# Patient Record
Sex: FEMALE | Race: WHITE | NOT HISPANIC OR LATINO | URBAN - METROPOLITAN AREA
[De-identification: names, ages, dates, MRNs, and addresses within clinical notes are randomized per-mention and may not be internally consistent; named-entity substitution may affect disease eponyms.]

---

## 2018-09-27 ENCOUNTER — OUTPATIENT (OUTPATIENT)
Dept: OUTPATIENT SERVICES | Facility: HOSPITAL | Age: 83
LOS: 1 days | End: 2018-09-27

## 2018-09-27 ENCOUNTER — APPOINTMENT (OUTPATIENT)
Dept: INFECTIOUS DISEASE | Facility: CLINIC | Age: 83
End: 2018-09-27
Payer: MEDICARE

## 2018-09-27 VITALS
RESPIRATION RATE: 12 BRPM | HEART RATE: 70 BPM | SYSTOLIC BLOOD PRESSURE: 138 MMHG | TEMPERATURE: 97.9 F | DIASTOLIC BLOOD PRESSURE: 66 MMHG | OXYGEN SATURATION: 98 %

## 2018-09-27 VITALS — HEIGHT: 62 IN | BODY MASS INDEX: 20.24 KG/M2 | WEIGHT: 110 LBS

## 2018-09-27 DIAGNOSIS — R79.89 OTHER SPECIFIED ABNORMAL FINDINGS OF BLOOD CHEMISTRY: ICD-10-CM

## 2018-09-27 LAB
ALBUMIN SERPL ELPH-MCNC: 4.3 G/DL — SIGNIFICANT CHANGE UP (ref 3.3–5)
ALP SERPL-CCNC: 68 U/L — SIGNIFICANT CHANGE UP (ref 40–120)
ALT FLD-CCNC: 24 U/L — SIGNIFICANT CHANGE UP (ref 10–45)
ANION GAP SERPL CALC-SCNC: 13 MMOL/L — SIGNIFICANT CHANGE UP (ref 5–17)
APPEARANCE UR: CLEAR — SIGNIFICANT CHANGE UP
AST SERPL-CCNC: 30 U/L — SIGNIFICANT CHANGE UP (ref 10–40)
BASOPHILS NFR BLD AUTO: 0.5 % — SIGNIFICANT CHANGE UP (ref 0–2)
BILIRUB SERPL-MCNC: 0.5 MG/DL — SIGNIFICANT CHANGE UP (ref 0.2–1.2)
BILIRUB UR-MCNC: NEGATIVE — SIGNIFICANT CHANGE UP
BUN SERPL-MCNC: 11 MG/DL — SIGNIFICANT CHANGE UP (ref 7–23)
CALCIUM SERPL-MCNC: 9.8 MG/DL — SIGNIFICANT CHANGE UP (ref 8.4–10.5)
CHLORIDE SERPL-SCNC: 97 MMOL/L — SIGNIFICANT CHANGE UP (ref 96–108)
CHOLEST SERPL-MCNC: 258 MG/DL — HIGH (ref 10–199)
CO2 SERPL-SCNC: 25 MMOL/L — SIGNIFICANT CHANGE UP (ref 22–31)
COLOR SPEC: SIGNIFICANT CHANGE UP
CREAT SERPL-MCNC: 0.87 MG/DL — SIGNIFICANT CHANGE UP (ref 0.5–1.3)
DIFF PNL FLD: NEGATIVE — SIGNIFICANT CHANGE UP
EOSINOPHIL NFR BLD AUTO: 1.5 % — SIGNIFICANT CHANGE UP (ref 0–6)
GLUCOSE SERPL-MCNC: 79 MG/DL — SIGNIFICANT CHANGE UP (ref 70–99)
GLUCOSE UR QL: NEGATIVE — SIGNIFICANT CHANGE UP
HBV CORE AB SER-ACNC: SIGNIFICANT CHANGE UP
HBV SURFACE AB SER-ACNC: REACTIVE — SIGNIFICANT CHANGE UP
HBV SURFACE AG SER-ACNC: SIGNIFICANT CHANGE UP
HCT VFR BLD CALC: 44.7 % — SIGNIFICANT CHANGE UP (ref 34.5–45)
HCV AB S/CO SERPL IA: 0.03 S/CO — SIGNIFICANT CHANGE UP
HCV AB SERPL-IMP: SIGNIFICANT CHANGE UP
HDLC SERPL-MCNC: 98 MG/DL — SIGNIFICANT CHANGE UP
HGB BLD-MCNC: 15.2 G/DL — SIGNIFICANT CHANGE UP (ref 11.5–15.5)
KETONES UR-MCNC: NEGATIVE — SIGNIFICANT CHANGE UP
LEUKOCYTE ESTERASE UR-ACNC: ABNORMAL
LIPID PNL WITH DIRECT LDL SERPL: 142 MG/DL — HIGH
LYMPHOCYTES # BLD AUTO: 41.4 % — SIGNIFICANT CHANGE UP (ref 13–44)
MCHC RBC-ENTMCNC: 32.8 PG — SIGNIFICANT CHANGE UP (ref 27–34)
MCHC RBC-ENTMCNC: 34 G/DL — SIGNIFICANT CHANGE UP (ref 32–36)
MCV RBC AUTO: 96.3 FL — SIGNIFICANT CHANGE UP (ref 80–100)
MONOCYTES NFR BLD AUTO: 9 % — SIGNIFICANT CHANGE UP (ref 2–14)
NEUTROPHILS NFR BLD AUTO: 47.6 % — SIGNIFICANT CHANGE UP (ref 43–77)
NITRITE UR-MCNC: NEGATIVE — SIGNIFICANT CHANGE UP
PH UR: 6 — SIGNIFICANT CHANGE UP (ref 5–8)
PLATELET # BLD AUTO: 184 K/UL — SIGNIFICANT CHANGE UP (ref 150–400)
POTASSIUM SERPL-MCNC: 4.8 MMOL/L — SIGNIFICANT CHANGE UP (ref 3.5–5.3)
POTASSIUM SERPL-SCNC: 4.8 MMOL/L — SIGNIFICANT CHANGE UP (ref 3.5–5.3)
PROT SERPL-MCNC: 7 G/DL — SIGNIFICANT CHANGE UP (ref 6–8.3)
PROT UR-MCNC: NEGATIVE MG/DL — SIGNIFICANT CHANGE UP
RBC # BLD: 4.64 M/UL — SIGNIFICANT CHANGE UP (ref 3.8–5.2)
RBC # FLD: 12.7 % — SIGNIFICANT CHANGE UP (ref 10.3–16.9)
SODIUM SERPL-SCNC: 135 MMOL/L — SIGNIFICANT CHANGE UP (ref 135–145)
SP GR SPEC: <=1.005 — SIGNIFICANT CHANGE UP (ref 1–1.03)
TOTAL CHOLESTEROL/HDL RATIO MEASUREMENT: 2.6 RATIO — LOW (ref 3.3–7.1)
TRIGL SERPL-MCNC: 89 MG/DL — SIGNIFICANT CHANGE UP (ref 10–149)
UROBILINOGEN FLD QL: 0.2 E.U./DL — SIGNIFICANT CHANGE UP
WBC # BLD: 6 K/UL — SIGNIFICANT CHANGE UP (ref 3.8–10.5)
WBC # FLD AUTO: 6 K/UL — SIGNIFICANT CHANGE UP (ref 3.8–10.5)

## 2018-09-27 PROCEDURE — 99203 OFFICE O/P NEW LOW 30 MIN: CPT

## 2018-09-27 NOTE — PHYSICAL EXAM
[No Acute Distress] : no acute distress [Well Nourished] : well nourished [Well Developed] : well developed [Well-Appearing] : well-appearing [Normal Sclera/Conjunctiva] : normal sclera/conjunctiva [PERRL] : pupils equal round and reactive to light [EOMI] : extraocular movements intact [Normal Outer Ear/Nose] : the outer ears and nose were normal in appearance [Normal Oropharynx] : the oropharynx was normal [No Lymphadenopathy] : no lymphadenopathy [No Respiratory Distress] : no respiratory distress  [Clear to Auscultation] : lungs were clear to auscultation bilaterally [No Accessory Muscle Use] : no accessory muscle use [Normal Rate] : normal rate  [Regular Rhythm] : with a regular rhythm [Normal S1, S2] : normal S1 and S2 [No Murmur] : no murmur heard

## 2018-09-28 LAB
24R-OH-CALCIDIOL SERPL-MCNC: 45.8 NG/ML — SIGNIFICANT CHANGE UP (ref 30–80)
4/8 RATIO: 0.73 RATIO — LOW (ref 0.86–4.14)
ABS CD8: 910 /UL — HIGH (ref 90–775)
CD3 BLASTS SPEC-ACNC: 1567 /UL — SIGNIFICANT CHANGE UP (ref 396–2024)
CD3 BLASTS SPEC-ACNC: 72 % — SIGNIFICANT CHANGE UP (ref 58–84)
CD4 %: 30 % — SIGNIFICANT CHANGE UP (ref 30–56)
CD8 %: 42 % — SIGNIFICANT CHANGE UP (ref 11–43)
HIV-1 VIRAL LOAD RESULT: ABNORMAL
HIV1 RNA # SERPL NAA+PROBE: SIGNIFICANT CHANGE UP
HIV1 RNA SER-IMP: SIGNIFICANT CHANGE UP
HIV1 RNA SERPL NAA+PROBE-ACNC: ABNORMAL
HIV1 RNA SERPL NAA+PROBE-LOG#: ABNORMAL LG COP/ML
T PALLIDUM AB TITR SER: NEGATIVE — SIGNIFICANT CHANGE UP
T-CELL CD4 SUBSET PNL BLD: 660 /UL — SIGNIFICANT CHANGE UP (ref 325–1251)
VIT B12 SERPL-MCNC: 881 PG/ML — SIGNIFICANT CHANGE UP (ref 232–1245)

## 2018-09-28 NOTE — ASSESSMENT
[FreeTextEntry1] : RTC ~ 2 weeks\par \par Dr. Jenkins (HIV specialist) [address] [facility]? [facility] [address], [place], [place]... [phone]\par \par Dr. Jones (PCP):  [address] [facility] [place] [address].... [phone]

## 2018-09-28 NOTE — HISTORY OF PRESENT ILLNESS
[de-identified] : 83 F with HIV (VLUD / 802 July). HIV test was negative in 1988 and then she was diagnosed in 1989. Acquired from sexual activity with . Started on AZT in 1992. Later  in Aug 1992, she discontinued her AZT and was off ART for 5.5 years. She was started back on ART with an unknown regimen, and then changed to Truvada 3 times weekly/Isentress daily BID for ~ 7 years. Has been undetectable for years. No resistances as far as she knows.\par \par No other comorbidities, just takes xanax for sleep.\par H/o shingles with post-herpetic neuralgia for 14 months which resolved after experimental treatment with chemotherapy.\par Aso takes B12 and Vit D3 for hair follicles; does not feel.

## 2018-09-28 NOTE — HEALTH RISK ASSESSMENT
[No falls in past year] : Patient reported no falls in the past year [0] : 2) Feeling down, depressed, or hopeless: Not at all (0) [Patient reported mammogram was normal] : Patient reported mammogram was normal [Patient reported bone density results were normal] : Patient reported bone density results were normal [] : No [de-identified] : never [de-identified] : 0- 1-2 per night [de-identified] : no other substances [[id number]] : 0 [MammogramDate] : 09/18 [PapSmearComments] : ? [BoneDensityDate] : 09/16 [BoneDensityComments] : n [ColonoscopyComments] : < 5 years ago. Normal [de-identified] : stable housing, moved to [place] a few weeks ago from [place] [FreeTextEntry3] :  [de-identified] : sexually active with  [de-identified] : Domestic Violence Screen:\par -Do you ever feel unsafe at home? "No"\par -Are you in a relationship in which you have been physically hurt or felt threatened? "No"\par -Have you ever been or are you currently concerned about harming your partner or someone close to you? "No" [de-identified] : h/o cataracts. within year [de-identified] : follows

## 2018-09-28 NOTE — END OF VISIT
[] : Resident [FreeTextEntry3] : labs today\par Education on HIV tehrapy and new meds\par Continue current regimen \par RTC in 2 weeks to discuss other options\par \par Collaterals to be requested

## 2018-09-29 LAB
C TRACH RRNA SPEC QL NAA+PROBE: SIGNIFICANT CHANGE UP
GAMMA INTERFERON BACKGROUND BLD IA-ACNC: 0.09 IU/ML — SIGNIFICANT CHANGE UP
M TB IFN-G BLD-IMP: NEGATIVE — SIGNIFICANT CHANGE UP
M TB IFN-G CD4+ BCKGRND COR BLD-ACNC: -0.01 IU/ML — SIGNIFICANT CHANGE UP
M TB IFN-G CD4+CD8+ BCKGRND COR BLD-ACNC: -0.02 IU/ML — SIGNIFICANT CHANGE UP
N GONORRHOEA RRNA SPEC QL NAA+PROBE: SIGNIFICANT CHANGE UP
QUANT TB PLUS MITOGEN MINUS NIL: 8.77 IU/ML — SIGNIFICANT CHANGE UP
SPECIMEN SOURCE: SIGNIFICANT CHANGE UP

## 2018-10-03 DIAGNOSIS — B20 HUMAN IMMUNODEFICIENCY VIRUS [HIV] DISEASE: ICD-10-CM

## 2018-10-03 DIAGNOSIS — Z00.00 ENCOUNTER FOR GENERAL ADULT MEDICAL EXAMINATION WITHOUT ABNORMAL FINDINGS: ICD-10-CM

## 2018-10-03 DIAGNOSIS — R79.89 OTHER SPECIFIED ABNORMAL FINDINGS OF BLOOD CHEMISTRY: ICD-10-CM

## 2018-10-16 ENCOUNTER — APPOINTMENT (OUTPATIENT)
Dept: INFECTIOUS DISEASE | Facility: CLINIC | Age: 83
End: 2018-10-16
Payer: MEDICARE

## 2018-10-16 ENCOUNTER — OUTPATIENT (OUTPATIENT)
Dept: OUTPATIENT SERVICES | Facility: HOSPITAL | Age: 83
LOS: 1 days | End: 2018-10-16

## 2018-10-16 PROCEDURE — 99214 OFFICE O/P EST MOD 30 MIN: CPT

## 2018-10-17 NOTE — HISTORY OF PRESENT ILLNESS
[de-identified] : 83 year old female with HIV (VL < 30 / CD4 of 660 Sep 27th 2018 on Truvada M,W,F and Isentress BID daily with optimal adherence) recently established care presents to discuss ART options. No genotype on file and no history of treatment failure or resistances per pt. Feels well with no complaints.\par \par CrCl 40

## 2018-10-22 DIAGNOSIS — B20 HUMAN IMMUNODEFICIENCY VIRUS [HIV] DISEASE: ICD-10-CM

## 2018-10-22 DIAGNOSIS — Z00.00 ENCOUNTER FOR GENERAL ADULT MEDICAL EXAMINATION WITHOUT ABNORMAL FINDINGS: ICD-10-CM

## 2018-11-15 ENCOUNTER — APPOINTMENT (OUTPATIENT)
Dept: INFECTIOUS DISEASE | Facility: CLINIC | Age: 83
End: 2018-11-15
Payer: MEDICARE

## 2018-11-15 ENCOUNTER — OUTPATIENT (OUTPATIENT)
Dept: OUTPATIENT SERVICES | Facility: HOSPITAL | Age: 83
LOS: 1 days | End: 2018-11-15

## 2018-11-15 VITALS
RESPIRATION RATE: 12 BRPM | HEART RATE: 72 BPM | DIASTOLIC BLOOD PRESSURE: 70 MMHG | SYSTOLIC BLOOD PRESSURE: 120 MMHG | TEMPERATURE: 98 F

## 2018-11-15 LAB
ALBUMIN SERPL ELPH-MCNC: 4.5 G/DL — SIGNIFICANT CHANGE UP (ref 3.3–5)
ALP SERPL-CCNC: 57 U/L — SIGNIFICANT CHANGE UP (ref 40–120)
ALT FLD-CCNC: 14 U/L — SIGNIFICANT CHANGE UP (ref 10–45)
ANION GAP SERPL CALC-SCNC: 15 MMOL/L — SIGNIFICANT CHANGE UP (ref 5–17)
AST SERPL-CCNC: 19 U/L — SIGNIFICANT CHANGE UP (ref 10–40)
BASOPHILS NFR BLD AUTO: 0.4 % — SIGNIFICANT CHANGE UP (ref 0–2)
BILIRUB SERPL-MCNC: 0.5 MG/DL — SIGNIFICANT CHANGE UP (ref 0.2–1.2)
BUN SERPL-MCNC: 14 MG/DL — SIGNIFICANT CHANGE UP (ref 7–23)
CALCIUM SERPL-MCNC: 9.9 MG/DL — SIGNIFICANT CHANGE UP (ref 8.4–10.5)
CHLORIDE SERPL-SCNC: 97 MMOL/L — SIGNIFICANT CHANGE UP (ref 96–108)
CO2 SERPL-SCNC: 23 MMOL/L — SIGNIFICANT CHANGE UP (ref 22–31)
CREAT SERPL-MCNC: 0.86 MG/DL — SIGNIFICANT CHANGE UP (ref 0.5–1.3)
EOSINOPHIL NFR BLD AUTO: 0.9 % — SIGNIFICANT CHANGE UP (ref 0–6)
GLUCOSE SERPL-MCNC: 89 MG/DL — SIGNIFICANT CHANGE UP (ref 70–99)
HCT VFR BLD CALC: 42.6 % — SIGNIFICANT CHANGE UP (ref 34.5–45)
HGB BLD-MCNC: 14.2 G/DL — SIGNIFICANT CHANGE UP (ref 11.5–15.5)
LYMPHOCYTES # BLD AUTO: 38 % — SIGNIFICANT CHANGE UP (ref 13–44)
MCHC RBC-ENTMCNC: 32.3 PG — SIGNIFICANT CHANGE UP (ref 27–34)
MCHC RBC-ENTMCNC: 33.3 G/DL — SIGNIFICANT CHANGE UP (ref 32–36)
MCV RBC AUTO: 97 FL — SIGNIFICANT CHANGE UP (ref 80–100)
MONOCYTES NFR BLD AUTO: 9.1 % — SIGNIFICANT CHANGE UP (ref 2–14)
NEUTROPHILS NFR BLD AUTO: 51.6 % — SIGNIFICANT CHANGE UP (ref 43–77)
PLATELET # BLD AUTO: 215 K/UL — SIGNIFICANT CHANGE UP (ref 150–400)
POTASSIUM SERPL-MCNC: 4.8 MMOL/L — SIGNIFICANT CHANGE UP (ref 3.5–5.3)
POTASSIUM SERPL-SCNC: 4.8 MMOL/L — SIGNIFICANT CHANGE UP (ref 3.5–5.3)
PROT SERPL-MCNC: 7.4 G/DL — SIGNIFICANT CHANGE UP (ref 6–8.3)
RBC # BLD: 4.39 M/UL — SIGNIFICANT CHANGE UP (ref 3.8–5.2)
RBC # FLD: 12.7 % — SIGNIFICANT CHANGE UP (ref 10.3–16.9)
SODIUM SERPL-SCNC: 135 MMOL/L — SIGNIFICANT CHANGE UP (ref 135–145)
WBC # BLD: 6.7 K/UL — SIGNIFICANT CHANGE UP (ref 3.8–10.5)
WBC # FLD AUTO: 6.7 K/UL — SIGNIFICANT CHANGE UP (ref 3.8–10.5)

## 2018-11-15 PROCEDURE — 99214 OFFICE O/P EST MOD 30 MIN: CPT

## 2018-11-15 NOTE — PHYSICAL EXAM
[No Acute Distress] : no acute distress [Normal Sclera/Conjunctiva] : normal sclera/conjunctiva [PERRL] : pupils equal round and reactive to light [EOMI] : extraocular movements intact [Normal Outer Ear/Nose] : the outer ears and nose were normal in appearance [Normal Oropharynx] : the oropharynx was normal [No Lymphadenopathy] : no lymphadenopathy [No Respiratory Distress] : no respiratory distress  [Clear to Auscultation] : lungs were clear to auscultation bilaterally [No Accessory Muscle Use] : no accessory muscle use [Normal Rate] : normal rate  [Regular Rhythm] : with a regular rhythm [Normal S1, S2] : normal S1 and S2 [No Murmur] : no murmur heard

## 2018-11-16 LAB
4/8 RATIO: 0.61 RATIO — LOW (ref 0.86–4.14)
ABS CD8: 857 /UL — HIGH (ref 90–775)
CD3 BLASTS SPEC-ACNC: 1406 /UL — SIGNIFICANT CHANGE UP (ref 396–2024)
CD3 BLASTS SPEC-ACNC: 62 % — SIGNIFICANT CHANGE UP (ref 58–84)
CD4 %: 23 % — LOW (ref 30–56)
CD8 %: 38 % — SIGNIFICANT CHANGE UP (ref 11–43)
HIV-1 VIRAL LOAD RESULT: SIGNIFICANT CHANGE UP
HIV1 RNA # SERPL NAA+PROBE: SIGNIFICANT CHANGE UP
HIV1 RNA SER-IMP: SIGNIFICANT CHANGE UP
HIV1 RNA SERPL NAA+PROBE-ACNC: SIGNIFICANT CHANGE UP
HIV1 RNA SERPL NAA+PROBE-LOG#: SIGNIFICANT CHANGE UP LG COP/ML
T-CELL CD4 SUBSET PNL BLD: 525 /UL — SIGNIFICANT CHANGE UP (ref 325–1251)

## 2018-11-16 NOTE — HISTORY OF PRESENT ILLNESS
[Spouse] : spouse [de-identified] : 83 year old female with HIV (VL < 30 / CD4 of 660 Sep 27th 2018 changed from Truvada M,W,F and Isentress BID daily to Juluca 1 month ago; with optimal adherence) presents for follow-up of medication change. Since changing to Juluca, she had been having night sweats mostly every night, except last night, and possibly more fatigue. Otherwise no side effects. No missed doses. Feels well with no complaints.\par \par CrCl 40

## 2018-11-16 NOTE — END OF VISIT
[] : Resident [FreeTextEntry3] : Has night seats recently that she attributes to the new HIV meds\par \par Recommended changing the timing of meds\par \par Labs today\par Re-eval in 2 months\par If still with side effects with consider changing ARVS\par \par Spoke with  who is HIV negative and was accompanying his wife today

## 2018-11-16 NOTE — REVIEW OF SYSTEMS
[Fever] : no fever [Chills] : no chills [Recent Change In Weight] : ~T no recent weight change [Chest Pain] : no chest pain [Shortness Of Breath] : no shortness of breath [Abdominal Pain] : no abdominal pain [Nausea] : no nausea [Vomiting] : no vomiting

## 2018-11-20 DIAGNOSIS — B20 HUMAN IMMUNODEFICIENCY VIRUS [HIV] DISEASE: ICD-10-CM

## 2019-01-07 ENCOUNTER — RX RENEWAL (OUTPATIENT)
Age: 84
End: 2019-01-07

## 2019-01-15 ENCOUNTER — OUTPATIENT (OUTPATIENT)
Dept: OUTPATIENT SERVICES | Facility: HOSPITAL | Age: 84
LOS: 1 days | End: 2019-01-15

## 2019-01-15 ENCOUNTER — APPOINTMENT (OUTPATIENT)
Dept: INFECTIOUS DISEASE | Facility: CLINIC | Age: 84
End: 2019-01-15
Payer: MEDICARE

## 2019-01-15 VITALS
WEIGHT: 111 LBS | OXYGEN SATURATION: 95 % | DIASTOLIC BLOOD PRESSURE: 60 MMHG | SYSTOLIC BLOOD PRESSURE: 133 MMHG | HEIGHT: 62 IN | RESPIRATION RATE: 12 BRPM | BODY MASS INDEX: 20.43 KG/M2 | TEMPERATURE: 97.7 F | HEART RATE: 66 BPM

## 2019-01-15 LAB
ALBUMIN SERPL ELPH-MCNC: 4.1 G/DL — SIGNIFICANT CHANGE UP (ref 3.3–5)
ALP SERPL-CCNC: 59 U/L — SIGNIFICANT CHANGE UP (ref 40–120)
ALT FLD-CCNC: 13 U/L — SIGNIFICANT CHANGE UP (ref 10–45)
ANION GAP SERPL CALC-SCNC: 14 MMOL/L — SIGNIFICANT CHANGE UP (ref 5–17)
AST SERPL-CCNC: 18 U/L — SIGNIFICANT CHANGE UP (ref 10–40)
BASOPHILS NFR BLD AUTO: 0.4 % — SIGNIFICANT CHANGE UP (ref 0–2)
BILIRUB SERPL-MCNC: 0.5 MG/DL — SIGNIFICANT CHANGE UP (ref 0.2–1.2)
BUN SERPL-MCNC: 15 MG/DL — SIGNIFICANT CHANGE UP (ref 7–23)
CALCIUM SERPL-MCNC: 9.2 MG/DL — SIGNIFICANT CHANGE UP (ref 8.4–10.5)
CHLORIDE SERPL-SCNC: 102 MMOL/L — SIGNIFICANT CHANGE UP (ref 96–108)
CO2 SERPL-SCNC: 25 MMOL/L — SIGNIFICANT CHANGE UP (ref 22–31)
CREAT SERPL-MCNC: 0.88 MG/DL — SIGNIFICANT CHANGE UP (ref 0.5–1.3)
EOSINOPHIL NFR BLD AUTO: 1.1 % — SIGNIFICANT CHANGE UP (ref 0–6)
GLUCOSE SERPL-MCNC: 90 MG/DL — SIGNIFICANT CHANGE UP (ref 70–99)
HCT VFR BLD CALC: 41 % — SIGNIFICANT CHANGE UP (ref 34.5–45)
HGB BLD-MCNC: 13.2 G/DL — SIGNIFICANT CHANGE UP (ref 11.5–15.5)
LYMPHOCYTES # BLD AUTO: 18.3 % — SIGNIFICANT CHANGE UP (ref 13–44)
MCHC RBC-ENTMCNC: 31 PG — SIGNIFICANT CHANGE UP (ref 27–34)
MCHC RBC-ENTMCNC: 32.2 G/DL — SIGNIFICANT CHANGE UP (ref 32–36)
MCV RBC AUTO: 96.2 FL — SIGNIFICANT CHANGE UP (ref 80–100)
MONOCYTES NFR BLD AUTO: 10.2 % — SIGNIFICANT CHANGE UP (ref 2–14)
NEUTROPHILS NFR BLD AUTO: 70 % — SIGNIFICANT CHANGE UP (ref 43–77)
PLATELET # BLD AUTO: 200 K/UL — SIGNIFICANT CHANGE UP (ref 150–400)
POTASSIUM SERPL-MCNC: 4.4 MMOL/L — SIGNIFICANT CHANGE UP (ref 3.5–5.3)
POTASSIUM SERPL-SCNC: 4.4 MMOL/L — SIGNIFICANT CHANGE UP (ref 3.5–5.3)
PROT SERPL-MCNC: 6.6 G/DL — SIGNIFICANT CHANGE UP (ref 6–8.3)
RBC # BLD: 4.26 M/UL — SIGNIFICANT CHANGE UP (ref 3.8–5.2)
RBC # FLD: 12.7 % — SIGNIFICANT CHANGE UP (ref 10.3–16.9)
SODIUM SERPL-SCNC: 141 MMOL/L — SIGNIFICANT CHANGE UP (ref 135–145)
WBC # BLD: 8.2 K/UL — SIGNIFICANT CHANGE UP (ref 3.8–10.5)
WBC # FLD AUTO: 8.2 K/UL — SIGNIFICANT CHANGE UP (ref 3.8–10.5)

## 2019-01-15 PROCEDURE — 99214 OFFICE O/P EST MOD 30 MIN: CPT

## 2019-01-16 LAB
4/8 RATIO: 0.77 RATIO — LOW (ref 0.86–4.14)
ABS CD8: 607 /UL — SIGNIFICANT CHANGE UP (ref 90–775)
CD3 BLASTS SPEC-ACNC: 1101 /UL — SIGNIFICANT CHANGE UP (ref 396–2024)
CD3 BLASTS SPEC-ACNC: 71 % — SIGNIFICANT CHANGE UP (ref 58–84)
CD4 %: 30 % — SIGNIFICANT CHANGE UP (ref 30–56)
CD8 %: 39 % — SIGNIFICANT CHANGE UP (ref 11–43)
HIV-1 VIRAL LOAD RESULT: SIGNIFICANT CHANGE UP
HIV1 RNA # SERPL NAA+PROBE: SIGNIFICANT CHANGE UP
HIV1 RNA SER-IMP: SIGNIFICANT CHANGE UP
HIV1 RNA SERPL NAA+PROBE-ACNC: SIGNIFICANT CHANGE UP
HIV1 RNA SERPL NAA+PROBE-LOG#: SIGNIFICANT CHANGE UP LG COP/ML
T-CELL CD4 SUBSET PNL BLD: 470 /UL — SIGNIFICANT CHANGE UP (ref 325–1251)

## 2019-01-25 DIAGNOSIS — B20 HUMAN IMMUNODEFICIENCY VIRUS [HIV] DISEASE: ICD-10-CM

## 2019-01-25 DIAGNOSIS — Z00.00 ENCOUNTER FOR GENERAL ADULT MEDICAL EXAMINATION WITHOUT ABNORMAL FINDINGS: ICD-10-CM

## 2019-02-20 ENCOUNTER — RX RENEWAL (OUTPATIENT)
Age: 84
End: 2019-02-20

## 2019-03-19 ENCOUNTER — APPOINTMENT (OUTPATIENT)
Dept: INFECTIOUS DISEASE | Facility: CLINIC | Age: 84
End: 2019-03-19
Payer: MEDICARE

## 2019-03-19 ENCOUNTER — OUTPATIENT (OUTPATIENT)
Dept: OUTPATIENT SERVICES | Facility: HOSPITAL | Age: 84
LOS: 1 days | End: 2019-03-19

## 2019-03-19 VITALS
OXYGEN SATURATION: 96 % | DIASTOLIC BLOOD PRESSURE: 67 MMHG | TEMPERATURE: 97.7 F | HEART RATE: 68 BPM | SYSTOLIC BLOOD PRESSURE: 133 MMHG | RESPIRATION RATE: 12 BRPM

## 2019-03-19 DIAGNOSIS — G47.00 INSOMNIA, UNSPECIFIED: ICD-10-CM

## 2019-03-19 LAB
ALBUMIN SERPL ELPH-MCNC: 4.5 G/DL — SIGNIFICANT CHANGE UP (ref 3.3–5)
ALP SERPL-CCNC: 54 U/L — SIGNIFICANT CHANGE UP (ref 40–120)
ALT FLD-CCNC: 13 U/L — SIGNIFICANT CHANGE UP (ref 10–45)
ANION GAP SERPL CALC-SCNC: 12 MMOL/L — SIGNIFICANT CHANGE UP (ref 5–17)
AST SERPL-CCNC: 22 U/L — SIGNIFICANT CHANGE UP (ref 10–40)
BASOPHILS # BLD AUTO: 0.04 K/UL — SIGNIFICANT CHANGE UP (ref 0–0.2)
BASOPHILS NFR BLD AUTO: 0.6 % — SIGNIFICANT CHANGE UP (ref 0–2)
BILIRUB SERPL-MCNC: 0.5 MG/DL — SIGNIFICANT CHANGE UP (ref 0.2–1.2)
BUN SERPL-MCNC: 11 MG/DL — SIGNIFICANT CHANGE UP (ref 7–23)
CALCIUM SERPL-MCNC: 9.7 MG/DL — SIGNIFICANT CHANGE UP (ref 8.4–10.5)
CHLORIDE SERPL-SCNC: 98 MMOL/L — SIGNIFICANT CHANGE UP (ref 96–108)
CO2 SERPL-SCNC: 25 MMOL/L — SIGNIFICANT CHANGE UP (ref 22–31)
CREAT SERPL-MCNC: 0.85 MG/DL — SIGNIFICANT CHANGE UP (ref 0.5–1.3)
EOSINOPHIL # BLD AUTO: 0.05 K/UL — SIGNIFICANT CHANGE UP (ref 0–0.5)
EOSINOPHIL NFR BLD AUTO: 0.8 % — SIGNIFICANT CHANGE UP (ref 0–6)
GLUCOSE SERPL-MCNC: 86 MG/DL — SIGNIFICANT CHANGE UP (ref 70–99)
HCT VFR BLD CALC: 41.5 % — SIGNIFICANT CHANGE UP (ref 34.5–45)
HGB BLD-MCNC: 13.8 G/DL — SIGNIFICANT CHANGE UP (ref 11.5–15.5)
IMM GRANULOCYTES NFR BLD AUTO: 0.3 % — SIGNIFICANT CHANGE UP (ref 0–1.5)
LYMPHOCYTES # BLD AUTO: 1.79 K/UL — SIGNIFICANT CHANGE UP (ref 1–3.3)
LYMPHOCYTES # BLD AUTO: 28.9 % — SIGNIFICANT CHANGE UP (ref 13–44)
MCHC RBC-ENTMCNC: 32.1 PG — SIGNIFICANT CHANGE UP (ref 27–34)
MCHC RBC-ENTMCNC: 33.3 GM/DL — SIGNIFICANT CHANGE UP (ref 32–36)
MCV RBC AUTO: 96.5 FL — SIGNIFICANT CHANGE UP (ref 80–100)
MONOCYTES # BLD AUTO: 0.59 K/UL — SIGNIFICANT CHANGE UP (ref 0–0.9)
MONOCYTES NFR BLD AUTO: 9.5 % — SIGNIFICANT CHANGE UP (ref 2–14)
NEUTROPHILS # BLD AUTO: 3.7 K/UL — SIGNIFICANT CHANGE UP (ref 1.8–7.4)
NEUTROPHILS NFR BLD AUTO: 59.9 % — SIGNIFICANT CHANGE UP (ref 43–77)
NRBC # BLD: 0 /100 WBCS — SIGNIFICANT CHANGE UP (ref 0–0)
PLATELET # BLD AUTO: 236 K/UL — SIGNIFICANT CHANGE UP (ref 150–400)
POTASSIUM SERPL-MCNC: 5.3 MMOL/L — SIGNIFICANT CHANGE UP (ref 3.5–5.3)
POTASSIUM SERPL-SCNC: 5.3 MMOL/L — SIGNIFICANT CHANGE UP (ref 3.5–5.3)
PROT SERPL-MCNC: 7.2 G/DL — SIGNIFICANT CHANGE UP (ref 6–8.3)
RBC # BLD: 4.3 M/UL — SIGNIFICANT CHANGE UP (ref 3.8–5.2)
RBC # FLD: 12.3 % — SIGNIFICANT CHANGE UP (ref 10.3–14.5)
SODIUM SERPL-SCNC: 135 MMOL/L — SIGNIFICANT CHANGE UP (ref 135–145)
WBC # BLD: 6.19 K/UL — SIGNIFICANT CHANGE UP (ref 3.8–10.5)
WBC # FLD AUTO: 6.19 K/UL — SIGNIFICANT CHANGE UP (ref 3.8–10.5)

## 2019-03-19 PROCEDURE — 99214 OFFICE O/P EST MOD 30 MIN: CPT

## 2019-03-20 LAB
4/8 RATIO: 0.74 RATIO — LOW (ref 0.86–4.14)
ABS CD8: 646 /UL — SIGNIFICANT CHANGE UP (ref 90–775)
CD3 BLASTS SPEC-ACNC: 1126 /UL — SIGNIFICANT CHANGE UP (ref 396–2024)
CD3 BLASTS SPEC-ACNC: 70 % — SIGNIFICANT CHANGE UP (ref 58–84)
CD4 %: 30 % — SIGNIFICANT CHANGE UP (ref 30–56)
CD8 %: 40 % — SIGNIFICANT CHANGE UP (ref 11–43)
HIV-1 VIRAL LOAD RESULT: ABNORMAL
HIV1 RNA # SERPL NAA+PROBE: SIGNIFICANT CHANGE UP
HIV1 RNA SER-IMP: SIGNIFICANT CHANGE UP
HIV1 RNA SERPL NAA+PROBE-ACNC: ABNORMAL
HIV1 RNA SERPL NAA+PROBE-LOG#: ABNORMAL LG COP/ML
T-CELL CD4 SUBSET PNL BLD: 477 /UL — SIGNIFICANT CHANGE UP (ref 325–1251)

## 2019-03-21 DIAGNOSIS — B20 HUMAN IMMUNODEFICIENCY VIRUS [HIV] DISEASE: ICD-10-CM

## 2019-03-21 DIAGNOSIS — G47.00 INSOMNIA, UNSPECIFIED: ICD-10-CM

## 2019-05-06 ENCOUNTER — RX RENEWAL (OUTPATIENT)
Age: 84
End: 2019-05-06

## 2019-07-11 ENCOUNTER — APPOINTMENT (OUTPATIENT)
Dept: HEART AND VASCULAR | Facility: CLINIC | Age: 84
End: 2019-07-11
Payer: MEDICARE

## 2019-07-11 VITALS
HEIGHT: 62 IN | WEIGHT: 109 LBS | OXYGEN SATURATION: 97 % | TEMPERATURE: 98.4 F | BODY MASS INDEX: 20.06 KG/M2 | SYSTOLIC BLOOD PRESSURE: 120 MMHG | DIASTOLIC BLOOD PRESSURE: 60 MMHG | HEART RATE: 67 BPM

## 2019-07-11 DIAGNOSIS — Z78.9 OTHER SPECIFIED HEALTH STATUS: ICD-10-CM

## 2019-07-11 DIAGNOSIS — Z82.3 FAMILY HISTORY OF STROKE: ICD-10-CM

## 2019-07-11 DIAGNOSIS — R55 SYNCOPE AND COLLAPSE: ICD-10-CM

## 2019-07-11 PROCEDURE — G0444 DEPRESSION SCREEN ANNUAL: CPT | Mod: 59

## 2019-07-11 PROCEDURE — G0446: CPT | Mod: 59

## 2019-07-11 PROCEDURE — 99204 OFFICE O/P NEW MOD 45 MIN: CPT | Mod: 25

## 2019-07-11 PROCEDURE — 93000 ELECTROCARDIOGRAM COMPLETE: CPT

## 2019-07-11 NOTE — HISTORY OF PRESENT ILLNESS
[FreeTextEntry1] : 84 F HIV (undetectable VL)\par \par 10 days ago syncope while seated \par \par  recent operation, under tremendous stress\par \par went to [facility] \par Echo Normal EF mild AI\par NcHCT normal\par CXR clear\par tele at [facility] with freqent pvcs\par \par n prior hx syncope, palpitaitons, loc\par \par EKG nsr lafb prwp (unchanged from hospitalrecords)

## 2019-07-11 NOTE — PHYSICAL EXAM
[General Appearance - Well Developed] : well developed [Normal Appearance] : normal appearance [Well Groomed] : well groomed [General Appearance - Well Nourished] : well nourished [No Deformities] : no deformities [General Appearance - In No Acute Distress] : no acute distress [Normal Conjunctiva] : the conjunctiva exhibited no abnormalities [Eyelids - No Xanthelasma] : the eyelids demonstrated no xanthelasmas [Normal Oral Mucosa] : normal oral mucosa [No Oral Pallor] : no oral pallor [Normal Jugular Venous A Waves Present] : normal jugular venous A waves present [Normal Jugular Venous V Waves Present] : normal jugular venous V waves present [No Oral Cyanosis] : no oral cyanosis [No Jugular Venous Sylvester A Waves] : no jugular venous sylvester A waves [Heart Rate And Rhythm] : heart rate and rhythm were normal [Heart Sounds] : normal S1 and S2 [Murmurs] : no murmurs present [Exaggerated Use Of Accessory Muscles For Inspiration] : no accessory muscle use [Auscultation Breath Sounds / Voice Sounds] : lungs were clear to auscultation bilaterally [Respiration, Rhythm And Depth] : normal respiratory rhythm and effort [Abdomen Soft] : soft [Abdomen Tenderness] : non-tender [Gait - Sufficient For Exercise Testing] : the gait was sufficient for exercise testing [Abnormal Walk] : normal gait [Abdomen Mass (___ Cm)] : no abdominal mass palpated [Nail Clubbing] : no clubbing of the fingernails [Cyanosis, Localized] : no localized cyanosis [Petechial Hemorrhages (___cm)] : no petechial hemorrhages [Skin Color & Pigmentation] : normal skin color and pigmentation [] : no ischemic changes [No Venous Stasis] : no venous stasis [Skin Lesions] : no skin lesions [No Skin Ulcers] : no skin ulcer [Oriented To Time, Place, And Person] : oriented to person, place, and time [Affect] : the affect was normal [No Xanthoma] : no  xanthoma was observed [Mood] : the mood was normal [No Anxiety] : not feeling anxious

## 2019-07-11 NOTE — DISCUSSION/SUMMARY
[FreeTextEntry1] : The number of diagnostic and/or management options \par CAD, HTN, HPL, CV Prevention\par \par syncope - suspect situational, given high emotional stress, however tele from [facility] with freq pvc, normal EF rajendra place 14 day loop recorder to eval burden of PVC\par \par psychotherapy referral given\par \par \par Labs, radiology: ekg echo hhospital records\par \par \par High Complexity Medical Decision Making\par \par Concern for Heart Disease\par Annual administration of PHQ-9 for the benefit of the patient\par Score = 0; Rx = Pt reports no loss, Reassurance provided (16min)\par Follow-up arranged - next scheduled visit\par \par New patient intensive behavioral therapy for cardiovascular disease (17min)\par Assess: risk of inc CVD\par Advise: ASA utility, BP monitoring, healthy diet, result in lower risk of CAD, DM, and HTN\par Agree: pt willing to change, agrees to behavioral change to promote a healthy diet\par Assist: behavioral change re: appropriate food choices, confidence in ability lower CAD risk\par Arrange: follow up visit for progress, preventive cardiology / nutritionist referral discussed\par \par Cardiovascular Prevention counseling (16min)\par ·	Advised SBP guidelines based on ACC/AHA and SPRINT trial increased CAD PAD and mortality \par ·	Assessed willingness to attempt - contemplation stage\par ·	Agree to no added salt and added sugar diet  - prevention medicine referral, nutritionist\par ·	Assist with resources provided - prevention medicine referral, nutritionist, individual counseling\par ·	Arrange ·	Follow-up arranged - next scheduled visit\par

## 2019-10-31 ENCOUNTER — APPOINTMENT (OUTPATIENT)
Dept: INFECTIOUS DISEASE | Facility: CLINIC | Age: 84
End: 2019-10-31

## 2019-11-06 ENCOUNTER — RX RENEWAL (OUTPATIENT)
Age: 84
End: 2019-11-06

## 2020-03-17 ENCOUNTER — APPOINTMENT (OUTPATIENT)
Dept: INFECTIOUS DISEASE | Facility: CLINIC | Age: 85
End: 2020-03-17

## 2020-04-27 ENCOUNTER — APPOINTMENT (OUTPATIENT)
Dept: INFECTIOUS DISEASE | Facility: CLINIC | Age: 85
End: 2020-04-27

## 2020-04-27 ENCOUNTER — OUTPATIENT (OUTPATIENT)
Dept: OUTPATIENT SERVICES | Facility: HOSPITAL | Age: 85
LOS: 1 days | End: 2020-04-27

## 2020-05-07 DIAGNOSIS — B20 HUMAN IMMUNODEFICIENCY VIRUS [HIV] DISEASE: ICD-10-CM

## 2020-11-05 ENCOUNTER — INPATIENT (INPATIENT)
Facility: HOSPITAL | Age: 85
LOS: 0 days | Discharge: HOME | DRG: 603 | End: 2020-11-06
Attending: HOSPITALIST | Admitting: HOSPITALIST
Payer: COMMERCIAL

## 2020-11-05 VITALS
TEMPERATURE: 98 F | SYSTOLIC BLOOD PRESSURE: 144 MMHG | HEART RATE: 78 BPM | WEIGHT: 111.99 LBS | DIASTOLIC BLOOD PRESSURE: 70 MMHG | RESPIRATION RATE: 18 BRPM | HEIGHT: 62 IN | OXYGEN SATURATION: 96 %

## 2020-11-05 DIAGNOSIS — G47.00 INSOMNIA, UNSPECIFIED: ICD-10-CM

## 2020-11-05 DIAGNOSIS — B20 HUMAN IMMUNODEFICIENCY VIRUS [HIV] DISEASE: ICD-10-CM

## 2020-11-05 DIAGNOSIS — L03.90 CELLULITIS, UNSPECIFIED: ICD-10-CM

## 2020-11-05 DIAGNOSIS — Z29.9 ENCOUNTER FOR PROPHYLACTIC MEASURES, UNSPECIFIED: ICD-10-CM

## 2020-11-05 DIAGNOSIS — R63.8 OTHER SYMPTOMS AND SIGNS CONCERNING FOOD AND FLUID INTAKE: ICD-10-CM

## 2020-11-05 LAB
ALBUMIN SERPL ELPH-MCNC: 4.6 G/DL — SIGNIFICANT CHANGE UP (ref 3.3–5)
ALP SERPL-CCNC: 67 U/L — SIGNIFICANT CHANGE UP (ref 40–120)
ALT FLD-CCNC: 10 U/L — SIGNIFICANT CHANGE UP (ref 10–45)
ANION GAP SERPL CALC-SCNC: 11 MMOL/L — SIGNIFICANT CHANGE UP (ref 5–17)
APTT BLD: 31.7 SEC — SIGNIFICANT CHANGE UP (ref 27.5–35.5)
AST SERPL-CCNC: 16 U/L — SIGNIFICANT CHANGE UP (ref 10–40)
BASOPHILS # BLD AUTO: 0.07 K/UL — SIGNIFICANT CHANGE UP (ref 0–0.2)
BASOPHILS NFR BLD AUTO: 1 % — SIGNIFICANT CHANGE UP (ref 0–2)
BILIRUB SERPL-MCNC: 0.4 MG/DL — SIGNIFICANT CHANGE UP (ref 0.2–1.2)
BUN SERPL-MCNC: 12 MG/DL — SIGNIFICANT CHANGE UP (ref 7–23)
CALCIUM SERPL-MCNC: 9.5 MG/DL — SIGNIFICANT CHANGE UP (ref 8.4–10.5)
CHLORIDE SERPL-SCNC: 99 MMOL/L — SIGNIFICANT CHANGE UP (ref 96–108)
CO2 SERPL-SCNC: 27 MMOL/L — SIGNIFICANT CHANGE UP (ref 22–31)
CREAT SERPL-MCNC: 0.81 MG/DL — SIGNIFICANT CHANGE UP (ref 0.5–1.3)
EOSINOPHIL # BLD AUTO: 0.09 K/UL — SIGNIFICANT CHANGE UP (ref 0–0.5)
EOSINOPHIL NFR BLD AUTO: 1.3 % — SIGNIFICANT CHANGE UP (ref 0–6)
GLUCOSE SERPL-MCNC: 113 MG/DL — HIGH (ref 70–99)
HCT VFR BLD CALC: 40.9 % — SIGNIFICANT CHANGE UP (ref 34.5–45)
HGB BLD-MCNC: 13.3 G/DL — SIGNIFICANT CHANGE UP (ref 11.5–15.5)
IMM GRANULOCYTES NFR BLD AUTO: 0.3 % — SIGNIFICANT CHANGE UP (ref 0–1.5)
INR BLD: 0.97 — SIGNIFICANT CHANGE UP (ref 0.88–1.16)
LYMPHOCYTES # BLD AUTO: 1.81 K/UL — SIGNIFICANT CHANGE UP (ref 1–3.3)
LYMPHOCYTES # BLD AUTO: 27 % — SIGNIFICANT CHANGE UP (ref 13–44)
MCHC RBC-ENTMCNC: 31.1 PG — SIGNIFICANT CHANGE UP (ref 27–34)
MCHC RBC-ENTMCNC: 32.5 GM/DL — SIGNIFICANT CHANGE UP (ref 32–36)
MCV RBC AUTO: 95.8 FL — SIGNIFICANT CHANGE UP (ref 80–100)
MONOCYTES # BLD AUTO: 0.65 K/UL — SIGNIFICANT CHANGE UP (ref 0–0.9)
MONOCYTES NFR BLD AUTO: 9.7 % — SIGNIFICANT CHANGE UP (ref 2–14)
NEUTROPHILS # BLD AUTO: 4.06 K/UL — SIGNIFICANT CHANGE UP (ref 1.8–7.4)
NEUTROPHILS NFR BLD AUTO: 60.7 % — SIGNIFICANT CHANGE UP (ref 43–77)
NRBC # BLD: 0 /100 WBCS — SIGNIFICANT CHANGE UP (ref 0–0)
PLATELET # BLD AUTO: 234 K/UL — SIGNIFICANT CHANGE UP (ref 150–400)
POTASSIUM SERPL-MCNC: 4.3 MMOL/L — SIGNIFICANT CHANGE UP (ref 3.5–5.3)
POTASSIUM SERPL-SCNC: 4.3 MMOL/L — SIGNIFICANT CHANGE UP (ref 3.5–5.3)
PROT SERPL-MCNC: 7.1 G/DL — SIGNIFICANT CHANGE UP (ref 6–8.3)
PROTHROM AB SERPL-ACNC: 11.6 SEC — SIGNIFICANT CHANGE UP (ref 10.6–13.6)
RBC # BLD: 4.27 M/UL — SIGNIFICANT CHANGE UP (ref 3.8–5.2)
RBC # FLD: 12.5 % — SIGNIFICANT CHANGE UP (ref 10.3–14.5)
SARS-COV-2 RNA SPEC QL NAA+PROBE: SIGNIFICANT CHANGE UP
SODIUM SERPL-SCNC: 137 MMOL/L — SIGNIFICANT CHANGE UP (ref 135–145)
WBC # BLD: 6.7 K/UL — SIGNIFICANT CHANGE UP (ref 3.8–10.5)
WBC # FLD AUTO: 6.7 K/UL — SIGNIFICANT CHANGE UP (ref 3.8–10.5)

## 2020-11-05 PROCEDURE — 99285 EMERGENCY DEPT VISIT HI MDM: CPT | Mod: CS

## 2020-11-05 PROCEDURE — 93971 EXTREMITY STUDY: CPT | Mod: 26,LT

## 2020-11-05 PROCEDURE — 73590 X-RAY EXAM OF LOWER LEG: CPT | Mod: 26,LT

## 2020-11-05 PROCEDURE — 71046 X-RAY EXAM CHEST 2 VIEWS: CPT | Mod: 26

## 2020-11-05 PROCEDURE — 99223 1ST HOSP IP/OBS HIGH 75: CPT | Mod: GC

## 2020-11-05 RX ORDER — DIPHENHYDRAMINE HCL 50 MG
50 CAPSULE ORAL ONCE
Refills: 0 | Status: COMPLETED | OUTPATIENT
Start: 2020-11-05 | End: 2020-11-05

## 2020-11-05 RX ORDER — DIPHENHYDRAMINE HCL 50 MG
25 CAPSULE ORAL ONCE
Refills: 0 | Status: DISCONTINUED | OUTPATIENT
Start: 2020-11-05 | End: 2020-11-05

## 2020-11-05 RX ORDER — DOLUTEGRAVIR SODIUM 25 MG/1
50 TABLET, FILM COATED ORAL EVERY 24 HOURS
Refills: 0 | Status: DISCONTINUED | OUTPATIENT
Start: 2020-11-06 | End: 2020-11-06

## 2020-11-05 RX ORDER — RILPIVIRINE HYDROCHLORIDE 25 MG/1
25 TABLET, FILM COATED ORAL
Refills: 0 | Status: DISCONTINUED | OUTPATIENT
Start: 2020-11-05 | End: 2020-11-06

## 2020-11-05 RX ORDER — ENOXAPARIN SODIUM 100 MG/ML
40 INJECTION SUBCUTANEOUS EVERY 24 HOURS
Refills: 0 | Status: DISCONTINUED | OUTPATIENT
Start: 2020-11-05 | End: 2020-11-06

## 2020-11-05 RX ORDER — VANCOMYCIN HCL 1 G
1000 VIAL (EA) INTRAVENOUS ONCE
Refills: 0 | Status: COMPLETED | OUTPATIENT
Start: 2020-11-05 | End: 2020-11-05

## 2020-11-05 RX ADMIN — Medication 100 MILLIGRAM(S): at 20:01

## 2020-11-05 RX ADMIN — Medication 50 MILLIGRAM(S): at 18:20

## 2020-11-05 RX ADMIN — Medication 250 MILLIGRAM(S): at 17:33

## 2020-11-05 NOTE — H&P ADULT - HISTORY OF PRESENT ILLNESS
A 84yo F with PMH of HIV, HLD, present in the ER who was sent in by her PMD for non-healing wound on the left lower leg. Pt states about 6 days ago, she accidently hit her shin (b/l) on the edge of laundry basket. which then caused a laceration which was repaired by another ED with steri-strips. A few days following, she followed up with her PMD for a wound check. She was then placed on x2 abx, doxy and keflex which she has been complaint with over the past 3 days. She had a follow-up today with her PMD, who referred to the ED due to worsening of her wound. She states pain is mild, however has noticed increasing redness and swelling at the site of the wound with discharge. She denies the following: fever, chills, numbness/tingling to extremity, dizziness, HA, n/v, sob, chest pain, difficulty walking. Pt's tdap was updated at initial encounter at [facility].    In ED VS: T 98, HR 78, /70, RR 18 and SpO2 96% on RA. Labs with glucose 113, otherwise wnl. US duplex LE negative for DVT. Pt received Clinda and Vancomycin in ED after which she had rxn, and given benadryl.

## 2020-11-05 NOTE — H&P ADULT - PROBLEM SELECTOR PLAN 3
Hx of insomnia, has been taking ambien 5mg QD for yrs; although RF given age   - continue for now   - collateral from PCP

## 2020-11-05 NOTE — ED ADULT NURSE NOTE - OBJECTIVE STATEMENT
pt has a left anterior lower shin wound which is not healing , was on oral antibiotics, denies fever/chills

## 2020-11-05 NOTE — H&P ADULT - NSHPPHYSICALEXAM_GEN_ALL_CORE
.  VITAL SIGNS:  T(C): 36.7 (11-05-20 @ 15:26), Max: 36.7 (11-05-20 @ 15:26)  T(F): 98 (11-05-20 @ 15:26), Max: 98 (11-05-20 @ 15:26)  HR: 68 (11-05-20 @ 19:13) (68 - 97)  BP: 157/75 (11-05-20 @ 19:13) (144/70 - 170/90)  BP(mean): --  RR: 18 (11-05-20 @ 19:13) (16 - 18)  SpO2: 98% (11-05-20 @ 19:13) (96% - 98%)  Wt(kg): --    PHYSICAL EXAM:    Constitutional: WDWN resting comfortably in bed; NAD  Head: NC/AT  Eyes: PERRL, EOMI, anicteric sclera  ENT: no nasal discharge; uvula midline, no oropharyngeal erythema or exudates; MMM  Neck: supple; no JVD or thyromegaly  Respiratory: CTA B/L; no W/R/R, no retractions  Cardiac: +S1/S2; RRR; no M/R/G; PMI non-displaced  Gastrointestinal: soft, NT/ND; no rebound or guarding; +BSx4  Genitourinary: normal external genitalia  Back: spine midline, no bony tenderness or step-offs; no CVAT B/L  Extremities: WWP, no clubbing or cyanosis; no peripheral edema  Musculoskeletal: NROM x4; no joint swelling, tenderness or erythema  Vascular: 2+ radial, femoral, DP/PT pulses B/L  Dermatologic: skin warm, dry and intact; no rashes, wounds, or scars  Lymphatic: no submandibular or cervical LAD  Neurologic: AAOx3; CNII-XII grossly intact; no focal deficits  Psychiatric: affect and characteristics of appearance, verbalizations, behaviors are appropriate .  VITAL SIGNS:  T(C): 36.7 (11-05-20 @ 15:26), Max: 36.7 (11-05-20 @ 15:26)  T(F): 98 (11-05-20 @ 15:26), Max: 98 (11-05-20 @ 15:26)  HR: 68 (11-05-20 @ 19:13) (68 - 97)  BP: 157/75 (11-05-20 @ 19:13) (144/70 - 170/90)  BP(mean): --  RR: 18 (11-05-20 @ 19:13) (16 - 18)  SpO2: 98% (11-05-20 @ 19:13) (96% - 98%)  Wt(kg): --    PHYSICAL EXAM:    Constitutional: WDWN resting comfortably in bed; NAD  Head: NC/AT  Eyes: PERRL, EOMI, anicteric sclera  ENT: no nasal discharge; uvula midline, no oropharyngeal erythema or exudates; MMM  Neck: supple; no JVD or thyromegaly  Respiratory: CTA B/L; no W/R/R, no retractions  Cardiac: +S1/S2; RRR; no M/R/G; PMI non-displaced  Gastrointestinal: soft, NT/ND; no rebound or guarding; +BSx4  Extremities: b/l LE healing wounds, b/l anterior tibia with small laceration s/p steristrips. LLE laceration 3cm, with surrounding erythema; no discharge/pus; no fluctuance   Neurologic: AAOx3; CNII-XII grossly intact; no focal deficits

## 2020-11-05 NOTE — ED PROVIDER NOTE - ATTENDING CONTRIBUTION TO CARE
84 yo f w hx of HIV, HLD presents to ED with concern for infected soft tissue skin infection - sent by PCP for IV abx. 86 yo f w hx of HIV, HLD presents to ED with concern for infected soft tissue skin infection - sent by PCP for IV abx.  No fever or chills.  She sustained injury to bilateral shins 1 week ago, steri strips placed at another area ED, started on oral abx.  Went back to PCP for wound check today and sent to ED as cellulitis seems to be worsening.  On exam, patient is non toxic.  HRRR, lungs clear.  Abd soft, NT/ND.  + Pretibial partial skin avulsion on left with surrounding erythema, swelling and ttp.  No discharge.  Will check labs, blood cultures, order IV abx and anticipate admission for failed outpatient therapy.

## 2020-11-05 NOTE — H&P ADULT - PROBLEM SELECTOR PLAN 2
PT with hx of HIV, diagnosed 30yrs ago; on Juluca and complaint with medication. Last CD4 447 and VLUD in 3/2019. Follows DR. C[facility]   - c/w juluca  - f/up VL and CD4

## 2020-11-05 NOTE — H&P ADULT - ASSESSMENT
A 84yo F with PMH of HIV, HLD, present in the ER who was sent in by her PMD for non-healing wound on the left lower leg with surrounding erythema concerning for cellulitis

## 2020-11-05 NOTE — ED PROVIDER NOTE - OBJECTIVE STATEMENT
84 y/o female with a PMHx of HIV and HLD is present in the ER who was sent in by her PMD for non-healing wound on the left lower leg. Pt states about 6 days ago, she accidently hit her shin (b/l) on the edge of laundry basket. which then caused a laceration which was repaired by another ED with steri-strips. A few days following, she followed up with her PMD for a wound check. She was then placed on x2 abx, doxy and keflex which she has been complaint with over the past 3 days. She had a follow-up today with her PMD, who referred to the ED due to worsening of her wound. She states pain is mild, however has noticed increasing redness and swelling at the site of the wound with discharge. She denies the following: fever, chills, numbness/tingling to extremity, dizziness, HA, n/v, sob, chest pain, difficulty walking. Pt's tdap was updated at initial encounter at [facility].

## 2020-11-05 NOTE — ED ADULT NURSE REASSESSMENT NOTE - NS ED NURSE REASSESS COMMENT FT1
Pt upstairs at US. + itching and redness to scalp. denies SOB, CP, dizziness. speaking in clear sentences. airway patent. KELLY RAMSAY made aware. pt medicated as per MAR with IVP 50mg Benadryl. Pt upstairs at US. + itching and redness to scalp. denies SOB, CP, dizziness, tongue/ lip swelling. speaking in clear sentences. airway patent. KELLY RAMSAY made aware. pt medicated as per MAR with IVP 50mg Benadryl.

## 2020-11-05 NOTE — ED PROVIDER NOTE - SKIN AREA #1
anterior/lower/partial avulsion laceration with increased erythema and swelling at site of wound with ttp and no active dc noted

## 2020-11-05 NOTE — ED PROVIDER NOTE - MUSCULOSKELETAL, MLM
B/L LE WITH GOOD ROM, SOFT COMPARTMENT, REFLEX AND SENSATION INTACT WITH 2+ PULSE AND GOOD CAP REFILL

## 2020-11-05 NOTE — H&P ADULT - PROBLEM SELECTOR PLAN 1
PT with LLE healing laceration after mechanical fall PT with LLE healing laceration after mechanical fall last week; She say her PCP who was concerned about cellulitis and started Doxy/Keflex 3 days ago; Erythema/pain did not improve; pt referred to ED for IV Abx. Pt denies fevers/chills, no leukocytosis. On Exam, no discharge/pus/fluctuance; however per pt there was some drainage and wound cx taken by PCP. Pt also HIV positive, last VLUD and CD4 400s (3/2019)  - s/p Vancomycin in ED- pt developed scalp pruritis and UE erythema; vanc stopped and pt given benadryl with improvement. No SOB/anaphylaxis   - s/p clidamycin with no rxn   - c/w Vancomycin with pretreatment benadryl and at slower rate to prevent VASQUEZ  - f/up wound Cx from PCP office Dr. Zepeda  - f/up blood cultures

## 2020-11-05 NOTE — ED ADULT TRIAGE NOTE - NS ED TRIAGE AVPU SCALE
Operative diagnosis: Complex regional pain syndrome of right upper extremity.    Postoperative diagnosis: Complex regional pain syndrome of right upper extremity.    Procedure: Stellate ganglion block with fluoroscopic guidance.    The patient is a very pleasant 52-year-old gentleman who had shoulder surgery by Dr. Neo Lorenzo. He developed a complex regional pain syndrome. After that and has been generally with both physical therapy and this will be his third stellate block. He has a swollen hand and wrist with decreased range of motion from normal.  After the last block. He had about a week and a half of good relief with much less swelling but then the swelling has returned. Since that time. I am impressed that he has done so well. His hand looks much less swollen and he has much greater range of motion since the last time I saw him. I explained the procedure to the patient as well as potential risks and complications. He understood and is willing to proceed.    She was taken to the operating room and placed in the supine position. After sterile prep and drape, the skin and subcutaneous tissue was anesthetized with 5 cc 1% lidocaine. Under fluoroscopic guidance, a 25-gauge spinal needle was placed in contact with the transverse process of C6 on the right. Placement was confirmed with injection 1 cc Isovue-M 200 and followed by a mixture of 80 mg Depo-Medrol and 10 cc of Marcaine 0.2% with epinephrine. He tolerated the procedure well. 30 seconds of fluoroscopy time was utilized. He had near immediate improvement of the pain in his knuckles. He was taken to the outpatient area for recovery and discharge. She will call any questions or problems. Follow-up is with Dr. Lorenzo. Thank you for this referral    
Alert-The patient is alert, awake and responds to voice. The patient is oriented to time, place, and person. The triage nurse is able to obtain subjective information.

## 2020-11-05 NOTE — ED PROVIDER NOTE - CLINICAL SUMMARY MEDICAL DECISION MAKING FREE TEXT BOX
86 y/o female here in the ED who was referred by Dr. Bhat for wound sustained to the b/l anterior shin, however with partial avulsion laceration that is worsening despite being on abx. Pt without constitutional symptoms. Plan for US to r/o dvt. Xray to r/o fx and labs. 84 y/o female here in the ED who was referred by Dr. Bhat for wound sustained to the b/l anterior shin, however with partial skin avulsion laceration that is worsening despite being on abx. Pt without constitutional symptoms. Plan for US to r/o dvt. Xray to r/o fx and labs. US negative. The patient has xrays that are negative for acute fracture or dislocation. The patient also has normal distal m/s/s and pulses; NVID; no ev of compartment syndrome or limb ischemia or neurovasc/nerve damage. Pt had a rxn to vanco, given benadryl with improvement of rash. No sxs of anaphylaxis. Unable to contact Dr. Julio. Discussed with hos/dayanna regarding admission for early developing cellulitis due to skin avulsion.

## 2020-11-05 NOTE — ED ADULT TRIAGE NOTE - OTHER COMPLAINTS
c/o of left lower leg infection due to wound from a fall 6 days ago.  pt was placed on keflex and doxy but failed.  + redness to left anterior shin.  denies fever and chills.

## 2020-11-05 NOTE — H&P ADULT - NSHPLABSRESULTS_GEN_ALL_CORE
.  LABS:                         13.3   6.70  )-----------( 234      ( 05 Nov 2020 16:41 )             40.9     11-05    137  |  99  |  12  ----------------------------<  113<H>  4.3   |  27  |  0.81    Ca    9.5      05 Nov 2020 16:41    TPro  7.1  /  Alb  4.6  /  TBili  0.4  /  DBili  x   /  AST  16  /  ALT  10  /  AlkPhos  67  11-05    PT/INR - ( 05 Nov 2020 16:41 )   PT: 11.6 sec;   INR: 0.97          PTT - ( 05 Nov 2020 16:41 )  PTT:31.7 sec              RADIOLOGY, EKG & ADDITIONAL TESTS: Reviewed.

## 2020-11-06 VITALS
SYSTOLIC BLOOD PRESSURE: 148 MMHG | HEART RATE: 62 BPM | RESPIRATION RATE: 18 BRPM | DIASTOLIC BLOOD PRESSURE: 77 MMHG | TEMPERATURE: 98 F | OXYGEN SATURATION: 98 %

## 2020-11-06 LAB
4/8 RATIO: 0.81 RATIO — LOW (ref 0.86–4.14)
A1C WITH ESTIMATED AVERAGE GLUCOSE RESULT: 5.3 % — SIGNIFICANT CHANGE UP (ref 4–5.6)
ABS CD8: 538 /UL — SIGNIFICANT CHANGE UP (ref 90–775)
ANION GAP SERPL CALC-SCNC: 10 MMOL/L — SIGNIFICANT CHANGE UP (ref 5–17)
APPEARANCE UR: CLEAR — SIGNIFICANT CHANGE UP
BACTERIA # UR AUTO: PRESENT /HPF
BILIRUB UR-MCNC: NEGATIVE — SIGNIFICANT CHANGE UP
BUN SERPL-MCNC: 15 MG/DL — SIGNIFICANT CHANGE UP (ref 7–23)
CALCIUM SERPL-MCNC: 9.1 MG/DL — SIGNIFICANT CHANGE UP (ref 8.4–10.5)
CD3 BLASTS SPEC-ACNC: 81 % — SIGNIFICANT CHANGE UP (ref 58–84)
CD3 BLASTS SPEC-ACNC: 986 /UL — SIGNIFICANT CHANGE UP (ref 396–2024)
CD4 %: 36 % — SIGNIFICANT CHANGE UP (ref 30–56)
CD8 %: 44 % — HIGH (ref 11–43)
CHLORIDE SERPL-SCNC: 102 MMOL/L — SIGNIFICANT CHANGE UP (ref 96–108)
CO2 SERPL-SCNC: 25 MMOL/L — SIGNIFICANT CHANGE UP (ref 22–31)
COLOR SPEC: YELLOW — SIGNIFICANT CHANGE UP
CREAT SERPL-MCNC: 0.79 MG/DL — SIGNIFICANT CHANGE UP (ref 0.5–1.3)
DIFF PNL FLD: ABNORMAL
EPI CELLS # UR: SIGNIFICANT CHANGE UP /HPF (ref 0–5)
ESTIMATED AVERAGE GLUCOSE: 105 MG/DL — SIGNIFICANT CHANGE UP (ref 68–114)
GLUCOSE SERPL-MCNC: 94 MG/DL — SIGNIFICANT CHANGE UP (ref 70–99)
GLUCOSE UR QL: NEGATIVE — SIGNIFICANT CHANGE UP
HCT VFR BLD CALC: 38.9 % — SIGNIFICANT CHANGE UP (ref 34.5–45)
HGB BLD-MCNC: 12.7 G/DL — SIGNIFICANT CHANGE UP (ref 11.5–15.5)
KETONES UR-MCNC: NEGATIVE — SIGNIFICANT CHANGE UP
LEUKOCYTE ESTERASE UR-ACNC: NEGATIVE — SIGNIFICANT CHANGE UP
MAGNESIUM SERPL-MCNC: 2 MG/DL — SIGNIFICANT CHANGE UP (ref 1.6–2.6)
MCHC RBC-ENTMCNC: 31.3 PG — SIGNIFICANT CHANGE UP (ref 27–34)
MCHC RBC-ENTMCNC: 32.6 GM/DL — SIGNIFICANT CHANGE UP (ref 32–36)
MCV RBC AUTO: 95.8 FL — SIGNIFICANT CHANGE UP (ref 80–100)
NITRITE UR-MCNC: NEGATIVE — SIGNIFICANT CHANGE UP
NRBC # BLD: 0 /100 WBCS — SIGNIFICANT CHANGE UP (ref 0–0)
PH UR: 6.5 — SIGNIFICANT CHANGE UP (ref 5–8)
PHOSPHATE SERPL-MCNC: 3.9 MG/DL — SIGNIFICANT CHANGE UP (ref 2.5–4.5)
PLATELET # BLD AUTO: 225 K/UL — SIGNIFICANT CHANGE UP (ref 150–400)
POTASSIUM SERPL-MCNC: 4.1 MMOL/L — SIGNIFICANT CHANGE UP (ref 3.5–5.3)
POTASSIUM SERPL-SCNC: 4.1 MMOL/L — SIGNIFICANT CHANGE UP (ref 3.5–5.3)
PROT UR-MCNC: NEGATIVE MG/DL — SIGNIFICANT CHANGE UP
RBC # BLD: 4.06 M/UL — SIGNIFICANT CHANGE UP (ref 3.8–5.2)
RBC # FLD: 12.5 % — SIGNIFICANT CHANGE UP (ref 10.3–14.5)
RBC CASTS # UR COMP ASSIST: < 5 /HPF — SIGNIFICANT CHANGE UP
SODIUM SERPL-SCNC: 137 MMOL/L — SIGNIFICANT CHANGE UP (ref 135–145)
SP GR SPEC: 1.01 — SIGNIFICANT CHANGE UP (ref 1–1.03)
T-CELL CD4 SUBSET PNL BLD: 438 /UL — SIGNIFICANT CHANGE UP (ref 325–1251)
UROBILINOGEN FLD QL: 0.2 E.U./DL — SIGNIFICANT CHANGE UP
WBC # BLD: 5.38 K/UL — SIGNIFICANT CHANGE UP (ref 3.8–10.5)
WBC # FLD AUTO: 5.38 K/UL — SIGNIFICANT CHANGE UP (ref 3.8–10.5)
WBC UR QL: < 5 /HPF — SIGNIFICANT CHANGE UP

## 2020-11-06 PROCEDURE — 87536 HIV-1 QUANT&REVRSE TRNSCRPJ: CPT

## 2020-11-06 PROCEDURE — 85027 COMPLETE CBC AUTOMATED: CPT

## 2020-11-06 PROCEDURE — 96374 THER/PROPH/DIAG INJ IV PUSH: CPT

## 2020-11-06 PROCEDURE — 36415 COLL VENOUS BLD VENIPUNCTURE: CPT

## 2020-11-06 PROCEDURE — 99285 EMERGENCY DEPT VISIT HI MDM: CPT | Mod: 25

## 2020-11-06 PROCEDURE — 80053 COMPREHEN METABOLIC PANEL: CPT

## 2020-11-06 PROCEDURE — 96375 TX/PRO/DX INJ NEW DRUG ADDON: CPT

## 2020-11-06 PROCEDURE — 71046 X-RAY EXAM CHEST 2 VIEWS: CPT

## 2020-11-06 PROCEDURE — 85730 THROMBOPLASTIN TIME PARTIAL: CPT

## 2020-11-06 PROCEDURE — 86360 T CELL ABSOLUTE COUNT/RATIO: CPT

## 2020-11-06 PROCEDURE — 83036 HEMOGLOBIN GLYCOSYLATED A1C: CPT

## 2020-11-06 PROCEDURE — 85610 PROTHROMBIN TIME: CPT

## 2020-11-06 PROCEDURE — 86359 T CELLS TOTAL COUNT: CPT

## 2020-11-06 PROCEDURE — 83735 ASSAY OF MAGNESIUM: CPT

## 2020-11-06 PROCEDURE — 80048 BASIC METABOLIC PNL TOTAL CA: CPT

## 2020-11-06 PROCEDURE — 81001 URINALYSIS AUTO W/SCOPE: CPT

## 2020-11-06 PROCEDURE — 93005 ELECTROCARDIOGRAM TRACING: CPT

## 2020-11-06 PROCEDURE — U0003: CPT

## 2020-11-06 PROCEDURE — 93971 EXTREMITY STUDY: CPT

## 2020-11-06 PROCEDURE — 85025 COMPLETE CBC W/AUTO DIFF WBC: CPT

## 2020-11-06 PROCEDURE — 84100 ASSAY OF PHOSPHORUS: CPT

## 2020-11-06 PROCEDURE — 87040 BLOOD CULTURE FOR BACTERIA: CPT

## 2020-11-06 PROCEDURE — 73590 X-RAY EXAM OF LOWER LEG: CPT

## 2020-11-06 RX ORDER — VANCOMYCIN HCL 1 G
1000 VIAL (EA) INTRAVENOUS EVERY 24 HOURS
Refills: 0 | Status: DISCONTINUED | OUTPATIENT
Start: 2020-11-06 | End: 2020-11-06

## 2020-11-06 RX ORDER — ZALEPLON 10 MG
5 CAPSULE ORAL AT BEDTIME
Refills: 0 | Status: DISCONTINUED | OUTPATIENT
Start: 2020-11-06 | End: 2020-11-06

## 2020-11-06 RX ORDER — DIPHENHYDRAMINE HCL 50 MG
50 CAPSULE ORAL EVERY 24 HOURS
Refills: 0 | Status: DISCONTINUED | OUTPATIENT
Start: 2020-11-06 | End: 2020-11-06

## 2020-11-06 RX ADMIN — DOLUTEGRAVIR SODIUM 50 MILLIGRAM(S): 25 TABLET, FILM COATED ORAL at 06:35

## 2020-11-06 RX ADMIN — Medication 50 MILLIGRAM(S): at 15:30

## 2020-11-06 RX ADMIN — RILPIVIRINE HYDROCHLORIDE 25 MILLIGRAM(S): 25 TABLET, FILM COATED ORAL at 10:20

## 2020-11-06 RX ADMIN — ENOXAPARIN SODIUM 40 MILLIGRAM(S): 100 INJECTION SUBCUTANEOUS at 06:35

## 2020-11-06 RX ADMIN — Medication 125 MILLIGRAM(S): at 16:30

## 2020-11-06 NOTE — DISCHARGE NOTE NURSING/CASE MANAGEMENT/SOCIAL WORK - PATIENT PORTAL LINK FT
You can access the FollowMyHealth Patient Portal offered by [facility] by registering at the following website: [email or web address]. By joining [employer]’s FollowMyHealth portal, you will also be able to view your health information using other applications (apps) compatible with our system.

## 2020-11-06 NOTE — DISCHARGE NOTE PROVIDER - NSDCFUADDAPPT_GEN_ALL_CORE_FT
Please schedule and confirm your follow-up appointment with your primary care provider, Dr. Bhat, within 1 week of being discharged from the hospital.

## 2020-11-06 NOTE — DISCHARGE NOTE PROVIDER - NSDCMRMEDTOKEN_GEN_ALL_CORE_FT
Juluca 50 mg-25 mg oral tablet: 1 tab(s) orally once a day  Xanax: 5 milligram(s) orally once a day (at bedtime)   Juluca 50 mg-25 mg oral tablet: 1 tab(s) orally once a day  linezolid 600 mg oral tablet: 1 tab(s) orally every 12 hours for 9 days  Xanax: 5 milligram(s) orally once a day (at bedtime)

## 2020-11-06 NOTE — DISCHARGE NOTE PROVIDER - HOSPITAL COURSE
#Discharge: do not delete    Patient is __ yo M/F with past medical history of _____  Presented with _____, found to have _____  Problem List/Main Diagnoses (system-based):   Inpatient treatment course:   New medications:   Labs to be followed outpatient:   Exam to be followed outpatient:    #Discharge: do not delete    Patient is 86 yo F with past medical history of HIV and HLD.  Presented with non-healing wound on the left lower leg, found to have cellulitis of LLE.    Problem List/Main Diagnoses (system-based):   1) Cellulitis: PT with LLE healing laceration after mechanical fall last week; She say her PCP who was concerned about cellulitis and started Doxy/Keflex 3 days ago; Erythema/pain did not improve; pt referred to ED for IV Abx. Pt denies fevers/chills, no leukocytosis. On exam, no discharge/pus/fluctuance; however per pt there was some pus drainage and wound cx taken by PCP. Pt also HIV positive, last VLUD and CD4 400s (3/2019).  - s/p Vancomycin x1 in ED - pt developed scalp pruritis and UE erythema; vanc stopped and pt given benadryl and IV rate slowed, subsequent resolution of symptoms. No SOB/anaphylaxis   - s/p clindamycin x1 in ED - no reaction  - Continued vancomycin at slower rate to prevent VASQUEZ, no recurrence of reaction/symptoms noted  - pt to follow-up w/ PCP Dr. Bhat; wound cultures were taken at PCP's office  - To be discharged on Linezolid 600mg PO q12h for 9 days     Problem/Plan - 2:  ·  Problem: HIV (human immunodeficiency virus infection).  Plan: PT with hx of HIV, diagnosed 30yrs ago; on Juluca and complaint with medication. Last CD4 447 and VLUD in 3/2019. Follows DR. [facility]   - c/w juluca  - f/up VL and CD4.      Problem/Plan - 3:  ·  Problem: Insomnia.  Plan: Hx of insomnia, has been taking ambien 5mg QD for yrs; although RF given age   - continue for now   - collateral from PCP.     Inpatient treatment course:     New medications: Linezolid 600mg PO q12h for 9 days  Labs to be followed outpatient: CBC (consider check plt count while on linezolid), wound cultures, blood cultures  Exam to be followed outpatient: n/a   #Discharge: do not delete    Patient is 84 yo F with past medical history of HIV and HLD.  Presented with non-healing wound on the left lower leg, found to have cellulitis of LLE.    Problem List/Main Diagnoses (system-based):   1) Cellulitis: PT with LLE healing laceration after mechanical fall last week; She say her PCP who was concerned about cellulitis and started Doxy/Keflex 3 days ago; Erythema/pain did not improve; pt referred to ED for IV Abx. Pt denies fevers/chills, no leukocytosis. On exam, no discharge/pus/fluctuance; however per pt there was some pus drainage and wound cx taken by PCP. Pt also HIV positive, last VLUD and CD4 400s (3/2019).  - s/p Vancomycin x1 in ED - pt developed scalp pruritis and UE erythema; vanc stopped and pt given benadryl and IV rate slowed, subsequent resolution of symptoms. No SOB/anaphylaxis   - s/p clindamycin x1 in ED - no reaction  - Continued vancomycin at slower rate to prevent VASQUEZ, no recurrence of reaction/symptoms noted  - pt to follow-up w/ PCP Dr. Bhat; wound cultures were taken at PCP's office  - To be discharged on Linezolid 600mg PO q12h for 9 days    2) HIV (human immunodeficiency virus infection): PT with hx of HIV, diagnosed 30yrs ago; on Juluca and complaint with medication. Last CD4 447 and VLUD in 3/2019. Follows Dr. Cesar [facility]. Continuing on home Juluca.     3) Insomnia: Hx of insomnia, has been taking ambien 5mg QD for yrs    Inpatient treatment course: Continued on vanc at lower infusion rate without issue. Transitioned to PO linezolid for discharge    New medications: Linezolid 600mg PO q12h for 9 days  Labs to be followed outpatient: CBC (consider check plt count while on linezolid), wound cultures, blood cultures  Exam to be followed outpatient: n/a

## 2020-11-06 NOTE — DISCHARGE NOTE PROVIDER - CARE PROVIDER_API CALL
Federico Bhat  INTERNAL MEDICINE  [address] [place] [address]  Phone: [phone]  Fax: [phone]  Established Patient  Follow Up Time: 1 week

## 2020-11-06 NOTE — DISCHARGE NOTE PROVIDER - NSDCCPCAREPLAN_GEN_ALL_CORE_FT
PRINCIPAL DISCHARGE DIAGNOSIS  Diagnosis: Cellulitis  Assessment and Plan of Treatment: You were admitted to the hospital because you were having redness and warmth of your extremity. You were found to have cellulitis. This is a common and potentially serious bacterial skin infection. With cellulitis, the bacteria enter the skin. Cellulitis may spread rapidly. Affected skin appears swollen and red and may be hot and tender. Without treatment with an antibiotic, cellulitis can be life-threatening. You were initially treated with intravenous antibiotics and transitioned to oral pills.   Please  the antibiotic Linezolid from your pharmacy [facility]. You are prescribed Linezolid 600mg every 12 hours for 9 days. Please follow up with your primary care doctor, Dr. Bhat, within 1 week for further evaluation and treatment.

## 2020-11-07 LAB
HIV-1 VIRAL LOAD RESULT: SIGNIFICANT CHANGE UP
HIV1 RNA # SERPL NAA+PROBE: SIGNIFICANT CHANGE UP
HIV1 RNA SER-IMP: SIGNIFICANT CHANGE UP
HIV1 RNA SERPL NAA+PROBE-ACNC: SIGNIFICANT CHANGE UP
HIV1 RNA SERPL NAA+PROBE-LOG#: SIGNIFICANT CHANGE UP LG COP/ML

## 2020-11-07 RX ORDER — LINEZOLID 600 MG/300ML
1 INJECTION, SOLUTION INTRAVENOUS
Qty: 18 | Refills: 0
Start: 2020-11-07 | End: 2020-11-15

## 2020-11-10 LAB
CULTURE RESULTS: SIGNIFICANT CHANGE UP
CULTURE RESULTS: SIGNIFICANT CHANGE UP
SPECIMEN SOURCE: SIGNIFICANT CHANGE UP
SPECIMEN SOURCE: SIGNIFICANT CHANGE UP

## 2020-11-11 DIAGNOSIS — G47.00 INSOMNIA, UNSPECIFIED: ICD-10-CM

## 2020-11-11 DIAGNOSIS — Z16.24 RESISTANCE TO MULTIPLE ANTIBIOTICS: ICD-10-CM

## 2020-11-11 DIAGNOSIS — Z88.8 ALLERGY STATUS TO OTHER DRUGS, MEDICAMENTS AND BIOLOGICAL SUBSTANCES: ICD-10-CM

## 2020-11-11 DIAGNOSIS — L03.116 CELLULITIS OF LEFT LOWER LIMB: ICD-10-CM

## 2020-11-11 DIAGNOSIS — Z88.2 ALLERGY STATUS TO SULFONAMIDES: ICD-10-CM

## 2020-11-11 DIAGNOSIS — Z88.0 ALLERGY STATUS TO PENICILLIN: ICD-10-CM

## 2020-11-11 DIAGNOSIS — Z21 ASYMPTOMATIC HUMAN IMMUNODEFICIENCY VIRUS [HIV] INFECTION STATUS: ICD-10-CM

## 2020-11-11 DIAGNOSIS — L29.9 PRURITUS, UNSPECIFIED: ICD-10-CM

## 2020-11-11 DIAGNOSIS — T36.8X5A ADVERSE EFFECT OF OTHER SYSTEMIC ANTIBIOTICS, INITIAL ENCOUNTER: ICD-10-CM

## 2020-11-11 DIAGNOSIS — E78.5 HYPERLIPIDEMIA, UNSPECIFIED: ICD-10-CM

## 2020-12-07 PROBLEM — E78.5 HYPERLIPIDEMIA, UNSPECIFIED: Chronic | Status: ACTIVE | Noted: 2020-11-05

## 2020-12-07 PROBLEM — B20 HUMAN IMMUNODEFICIENCY VIRUS [HIV] DISEASE: Chronic | Status: ACTIVE | Noted: 2020-11-05

## 2020-12-08 ENCOUNTER — APPOINTMENT (OUTPATIENT)
Dept: INFECTIOUS DISEASE | Facility: CLINIC | Age: 85
End: 2020-12-08
Payer: MEDICARE

## 2020-12-08 ENCOUNTER — OUTPATIENT (OUTPATIENT)
Dept: OUTPATIENT SERVICES | Facility: HOSPITAL | Age: 85
LOS: 1 days | End: 2020-12-08

## 2020-12-08 VITALS
HEART RATE: 72 BPM | SYSTOLIC BLOOD PRESSURE: 140 MMHG | DIASTOLIC BLOOD PRESSURE: 62 MMHG | TEMPERATURE: 97.9 F | OXYGEN SATURATION: 99 % | HEIGHT: 62 IN | RESPIRATION RATE: 12 BRPM | BODY MASS INDEX: 20.61 KG/M2 | WEIGHT: 112 LBS

## 2020-12-08 PROCEDURE — 99213 OFFICE O/P EST LOW 20 MIN: CPT | Mod: GC

## 2020-12-08 RX ORDER — ESCITALOPRAM OXALATE 5 MG/1
5 TABLET ORAL
Refills: 0 | Status: DISCONTINUED | COMMUNITY
End: 2020-12-08

## 2020-12-09 DIAGNOSIS — B20 HUMAN IMMUNODEFICIENCY VIRUS [HIV] DISEASE: ICD-10-CM

## 2020-12-09 NOTE — ASSESSMENT
[FreeTextEntry1] : Pt is an 86 y/o female w hx of HIV (VLUD and CD4 438 on Juluca), anxiety who presents for follow up appointment.

## 2020-12-09 NOTE — PLAN
[FreeTextEntry1] : #HIV\par - Excellent Juluca compliance, no adverse effects\par - CD4 stable at 438, VL remains undetectable\par - No medication changes at this time\par - Repeat labs in 6mo\par - RTC in 1 year\par \par #HCM\par - Noted all vaccinations UTD including prevnar, pneumovax, shingrix\par - Rest of HCM/primary care per Dr. Larson

## 2020-12-09 NOTE — END OF VISIT
[] : Resident [FreeTextEntry3] : I saw and evaluated the patient with resident. I reviewed the laboratory and radiologic data and reviewed the notes. \par I performed a medication reconciliation and  reviewed vaccination history and other health care maintenance and prevention topics in the EMR.\par I assessed patient's adherence to medications especially ARVs ( % 100 ) and inquired about AEs ( None).\par \par \par \par

## 2020-12-09 NOTE — HEALTH RISK ASSESSMENT
[Never (0 pts)] : Never (0 points) [No] : In the past 12 months have you used drugs other than those required for medical reasons? No [No falls in past year] : Patient reported no falls in the past year [0] : 2) Feeling down, depressed, or hopeless: Not at all (0) [] : No

## 2020-12-09 NOTE — HISTORY OF PRESENT ILLNESS
[FreeTextEntry1] : Follow up [de-identified] : Pt is an 86 y/o female w hx of HIV (VLUD and CD4 438 on Juluca), anxiety who presents for follow up appointment. Patient follows with Dr. Larson for general primary care and healthcare maintenance and follows at [facility] for HIV care. Patient states the Juluca has been "excellent" with no side effects and she has had 100% compliance. Labs performed one month ago show stable CD4 and VLUD. She has otherwise been well, no new medical problems, medications, intercurrent ED visits. She has been stressed taking care of her  but states she is coping well and has good support.

## 2021-02-03 ENCOUNTER — RX RENEWAL (OUTPATIENT)
Age: 86
End: 2021-02-03

## 2021-04-09 NOTE — PHYSICAL EXAM
[No Acute Distress] : no acute distress [Well-Appearing] : well-appearing [Normal Sclera/Conjunctiva] : normal sclera/conjunctiva [PERRL] : pupils equal round and reactive to light [EOMI] : extraocular movements intact [Normal Outer Ear/Nose] : the outer ears and nose were normal in appearance [Normal Oropharynx] : the oropharynx was normal [No Respiratory Distress] : no respiratory distress  [Clear to Auscultation] : lungs were clear to auscultation bilaterally [No Accessory Muscle Use] : no accessory muscle use [Normal Rate] : normal rate  [Regular Rhythm] : with a regular rhythm [Normal S1, S2] : normal S1 and S2 [No Murmur] : no murmur heard DASH, diabetic diet

## 2021-06-22 NOTE — H&P ADULT - NSICDXPASTMEDICALHX_GEN_ALL_CORE_FT
Patient arrives with mother who consents to treatment. Mother reports a couple days ago child was trying to kill ant and hit finger on wood ground and R 3rd finger continues to get more and more swollen. Patient able to bend finger but reports pain. Swelling noted.   
PAST MEDICAL HISTORY:  HIV (human immunodeficiency virus infection)     HLD (hyperlipidemia)

## 2021-09-08 ENCOUNTER — EMERGENCY (EMERGENCY)
Facility: HOSPITAL | Age: 86
LOS: 1 days | Discharge: HOME | End: 2021-09-08
Attending: EMERGENCY MEDICINE | Admitting: EMERGENCY MEDICINE
Payer: MEDICARE

## 2021-09-08 VITALS
HEART RATE: 57 BPM | SYSTOLIC BLOOD PRESSURE: 116 MMHG | OXYGEN SATURATION: 98 % | RESPIRATION RATE: 16 BRPM | DIASTOLIC BLOOD PRESSURE: 59 MMHG | TEMPERATURE: 98 F

## 2021-09-08 VITALS
HEART RATE: 69 BPM | RESPIRATION RATE: 16 BRPM | WEIGHT: 110.01 LBS | DIASTOLIC BLOOD PRESSURE: 67 MMHG | OXYGEN SATURATION: 97 % | TEMPERATURE: 98 F | HEIGHT: 62 IN | SYSTOLIC BLOOD PRESSURE: 197 MMHG

## 2021-09-08 DIAGNOSIS — Z88.0 ALLERGY STATUS TO PENICILLIN: ICD-10-CM

## 2021-09-08 DIAGNOSIS — Z88.8 ALLERGY STATUS TO OTHER DRUGS, MEDICAMENTS AND BIOLOGICAL SUBSTANCES: ICD-10-CM

## 2021-09-08 DIAGNOSIS — E78.5 HYPERLIPIDEMIA, UNSPECIFIED: ICD-10-CM

## 2021-09-08 DIAGNOSIS — E86.0 DEHYDRATION: ICD-10-CM

## 2021-09-08 DIAGNOSIS — R11.0 NAUSEA: ICD-10-CM

## 2021-09-08 DIAGNOSIS — Z88.2 ALLERGY STATUS TO SULFONAMIDES: ICD-10-CM

## 2021-09-08 DIAGNOSIS — Z20.822 CONTACT WITH AND (SUSPECTED) EXPOSURE TO COVID-19: ICD-10-CM

## 2021-09-08 DIAGNOSIS — R10.33 PERIUMBILICAL PAIN: ICD-10-CM

## 2021-09-08 DIAGNOSIS — R63.0 ANOREXIA: ICD-10-CM

## 2021-09-08 DIAGNOSIS — Z88.1 ALLERGY STATUS TO OTHER ANTIBIOTIC AGENTS STATUS: ICD-10-CM

## 2021-09-08 DIAGNOSIS — R53.1 WEAKNESS: ICD-10-CM

## 2021-09-08 DIAGNOSIS — Z21 ASYMPTOMATIC HUMAN IMMUNODEFICIENCY VIRUS [HIV] INFECTION STATUS: ICD-10-CM

## 2021-09-08 DIAGNOSIS — R19.7 DIARRHEA, UNSPECIFIED: ICD-10-CM

## 2021-09-08 LAB
ALBUMIN SERPL ELPH-MCNC: 4.6 G/DL — SIGNIFICANT CHANGE UP (ref 3.3–5)
ALP SERPL-CCNC: 64 U/L — SIGNIFICANT CHANGE UP (ref 40–120)
ALT FLD-CCNC: 12 U/L — SIGNIFICANT CHANGE UP (ref 10–45)
ANION GAP SERPL CALC-SCNC: 10 MMOL/L — SIGNIFICANT CHANGE UP (ref 5–17)
APPEARANCE UR: CLEAR — SIGNIFICANT CHANGE UP
AST SERPL-CCNC: 21 U/L — SIGNIFICANT CHANGE UP (ref 10–40)
BACTERIA # UR AUTO: PRESENT /HPF
BASOPHILS # BLD AUTO: 0.04 K/UL — SIGNIFICANT CHANGE UP (ref 0–0.2)
BASOPHILS NFR BLD AUTO: 0.5 % — SIGNIFICANT CHANGE UP (ref 0–2)
BILIRUB SERPL-MCNC: 0.6 MG/DL — SIGNIFICANT CHANGE UP (ref 0.2–1.2)
BILIRUB UR-MCNC: NEGATIVE — SIGNIFICANT CHANGE UP
BUN SERPL-MCNC: 10 MG/DL — SIGNIFICANT CHANGE UP (ref 7–23)
CALCIUM SERPL-MCNC: 9.4 MG/DL — SIGNIFICANT CHANGE UP (ref 8.4–10.5)
CHLORIDE SERPL-SCNC: 92 MMOL/L — LOW (ref 96–108)
CO2 SERPL-SCNC: 24 MMOL/L — SIGNIFICANT CHANGE UP (ref 22–31)
COLOR SPEC: YELLOW — SIGNIFICANT CHANGE UP
CREAT SERPL-MCNC: 0.63 MG/DL — SIGNIFICANT CHANGE UP (ref 0.5–1.3)
DIFF PNL FLD: ABNORMAL
EOSINOPHIL # BLD AUTO: 0.01 K/UL — SIGNIFICANT CHANGE UP (ref 0–0.5)
EOSINOPHIL NFR BLD AUTO: 0.1 % — SIGNIFICANT CHANGE UP (ref 0–6)
EPI CELLS # UR: SIGNIFICANT CHANGE UP /HPF (ref 0–5)
GLUCOSE SERPL-MCNC: 113 MG/DL — HIGH (ref 70–99)
GLUCOSE UR QL: NEGATIVE — SIGNIFICANT CHANGE UP
HCT VFR BLD CALC: 41.2 % — SIGNIFICANT CHANGE UP (ref 34.5–45)
HGB BLD-MCNC: 14 G/DL — SIGNIFICANT CHANGE UP (ref 11.5–15.5)
IMM GRANULOCYTES NFR BLD AUTO: 0.5 % — SIGNIFICANT CHANGE UP (ref 0–1.5)
KETONES UR-MCNC: ABNORMAL MG/DL
LEUKOCYTE ESTERASE UR-ACNC: NEGATIVE — SIGNIFICANT CHANGE UP
LIDOCAIN IGE QN: 32 U/L — SIGNIFICANT CHANGE UP (ref 7–60)
LYMPHOCYTES # BLD AUTO: 1.44 K/UL — SIGNIFICANT CHANGE UP (ref 1–3.3)
LYMPHOCYTES # BLD AUTO: 17.5 % — SIGNIFICANT CHANGE UP (ref 13–44)
MCHC RBC-ENTMCNC: 31.6 PG — SIGNIFICANT CHANGE UP (ref 27–34)
MCHC RBC-ENTMCNC: 34 GM/DL — SIGNIFICANT CHANGE UP (ref 32–36)
MCV RBC AUTO: 93 FL — SIGNIFICANT CHANGE UP (ref 80–100)
MONOCYTES # BLD AUTO: 0.59 K/UL — SIGNIFICANT CHANGE UP (ref 0–0.9)
MONOCYTES NFR BLD AUTO: 7.2 % — SIGNIFICANT CHANGE UP (ref 2–14)
NEUTROPHILS # BLD AUTO: 6.12 K/UL — SIGNIFICANT CHANGE UP (ref 1.8–7.4)
NEUTROPHILS NFR BLD AUTO: 74.2 % — SIGNIFICANT CHANGE UP (ref 43–77)
NITRITE UR-MCNC: NEGATIVE — SIGNIFICANT CHANGE UP
NRBC # BLD: 0 /100 WBCS — SIGNIFICANT CHANGE UP (ref 0–0)
PH UR: 6.5 — SIGNIFICANT CHANGE UP (ref 5–8)
PLATELET # BLD AUTO: 221 K/UL — SIGNIFICANT CHANGE UP (ref 150–400)
POTASSIUM SERPL-MCNC: 3.9 MMOL/L — SIGNIFICANT CHANGE UP (ref 3.5–5.3)
POTASSIUM SERPL-SCNC: 3.9 MMOL/L — SIGNIFICANT CHANGE UP (ref 3.5–5.3)
PROT SERPL-MCNC: 7.3 G/DL — SIGNIFICANT CHANGE UP (ref 6–8.3)
PROT UR-MCNC: 100 MG/DL
RBC # BLD: 4.43 M/UL — SIGNIFICANT CHANGE UP (ref 3.8–5.2)
RBC # FLD: 12.5 % — SIGNIFICANT CHANGE UP (ref 10.3–14.5)
RBC CASTS # UR COMP ASSIST: ABNORMAL /HPF
SARS-COV-2 RNA SPEC QL NAA+PROBE: NEGATIVE — SIGNIFICANT CHANGE UP
SODIUM SERPL-SCNC: 126 MMOL/L — LOW (ref 135–145)
SP GR SPEC: 1.02 — SIGNIFICANT CHANGE UP (ref 1–1.03)
UROBILINOGEN FLD QL: 0.2 E.U./DL — SIGNIFICANT CHANGE UP
WBC # BLD: 8.24 K/UL — SIGNIFICANT CHANGE UP (ref 3.8–10.5)
WBC # FLD AUTO: 8.24 K/UL — SIGNIFICANT CHANGE UP (ref 3.8–10.5)
WBC UR QL: < 5 /HPF — SIGNIFICANT CHANGE UP

## 2021-09-08 PROCEDURE — 99285 EMERGENCY DEPT VISIT HI MDM: CPT | Mod: 25

## 2021-09-08 PROCEDURE — 96374 THER/PROPH/DIAG INJ IV PUSH: CPT | Mod: XU

## 2021-09-08 PROCEDURE — 93005 ELECTROCARDIOGRAM TRACING: CPT

## 2021-09-08 PROCEDURE — 85025 COMPLETE CBC W/AUTO DIFF WBC: CPT

## 2021-09-08 PROCEDURE — 83690 ASSAY OF LIPASE: CPT

## 2021-09-08 PROCEDURE — 80053 COMPREHEN METABOLIC PANEL: CPT

## 2021-09-08 PROCEDURE — 36415 COLL VENOUS BLD VENIPUNCTURE: CPT

## 2021-09-08 PROCEDURE — 74177 CT ABD & PELVIS W/CONTRAST: CPT | Mod: 26,MC

## 2021-09-08 PROCEDURE — 87635 SARS-COV-2 COVID-19 AMP PRB: CPT

## 2021-09-08 PROCEDURE — 81001 URINALYSIS AUTO W/SCOPE: CPT

## 2021-09-08 PROCEDURE — 74177 CT ABD & PELVIS W/CONTRAST: CPT | Mod: MC

## 2021-09-08 PROCEDURE — 99284 EMERGENCY DEPT VISIT MOD MDM: CPT

## 2021-09-08 PROCEDURE — 96372 THER/PROPH/DIAG INJ SC/IM: CPT | Mod: XU

## 2021-09-08 RX ORDER — ONDANSETRON 8 MG/1
4 TABLET, FILM COATED ORAL ONCE
Refills: 0 | Status: COMPLETED | OUTPATIENT
Start: 2021-09-08 | End: 2021-09-08

## 2021-09-08 RX ORDER — IOHEXOL 300 MG/ML
30 INJECTION, SOLUTION INTRAVENOUS ONCE
Refills: 0 | Status: COMPLETED | OUTPATIENT
Start: 2021-09-08 | End: 2021-09-08

## 2021-09-08 RX ORDER — SODIUM CHLORIDE 9 MG/ML
1000 INJECTION INTRAMUSCULAR; INTRAVENOUS; SUBCUTANEOUS ONCE
Refills: 0 | Status: COMPLETED | OUTPATIENT
Start: 2021-09-08 | End: 2021-09-08

## 2021-09-08 RX ADMIN — IOHEXOL 30 MILLILITER(S): 300 INJECTION, SOLUTION INTRAVENOUS at 18:27

## 2021-09-08 RX ADMIN — SODIUM CHLORIDE 1000 MILLILITER(S): 9 INJECTION INTRAMUSCULAR; INTRAVENOUS; SUBCUTANEOUS at 18:27

## 2021-09-08 RX ADMIN — ONDANSETRON 4 MILLIGRAM(S): 8 TABLET, FILM COATED ORAL at 18:27

## 2021-09-08 RX ADMIN — Medication 20 MILLIGRAM(S): at 22:11

## 2021-09-08 NOTE — ED PROVIDER NOTE - OBJECTIVE STATEMENT
Pt is an 86F who presents to ED for generalized weakness and diarrhea for the past 1-2 days. pt states she has also been having lower abdominal cramping today and decreased appetite. pt reports no fever, (+) nausea, no vomiting. No chest pain or SOB.

## 2021-09-08 NOTE — ED ADULT NURSE NOTE - OBJECTIVE STATEMENT
86 y.o F a&ox4 walked in from triage c.o weakness. since 11 pm last night pr reports multiple episodes of diarrhea, nausea and lower abdominal pain. denies fevers, chills, CP, SOB, dizziness, hematuria, blood in stool, dizziness, hematuria, urinary complaints. speaking in clear, appropriate sentences, airway patent. pt states "my   last month and I havent been managing." denies SI/HI. reports having her daughter who she can call for emotional support.

## 2021-09-08 NOTE — ED PROVIDER NOTE - NSFOLLOWUPINSTRUCTIONS_ED_ALL_ED_FT
Abdominal Pain    Many things can cause abdominal pain. Many times, abdominal pain is not caused by a disease and will improve without treatment. Your health care provider will do a physical exam to determine if there is a dangerous cause of your pain; blood tests and imaging may help determine the cause of your pain. However, in many cases, no cause may be found and you may need further testing as an outpatient. Monitor your abdominal pain for any changes.     SEEK IMMEDIATE MEDICAL CARE IF YOU HAVE ANY OF THE FOLLOWING SYMPTOMS: worsening abdominal pain, uncontrollable vomiting, profuse diarrhea, inability to have bowel movements or pass gas, black or bloody stools, fever accompanying chest pain or back pain, or fainting. These symptoms may represent a serious problem that is an emergency. Do not wait to see if the symptoms will go away. Get medical help right away. Call 911 and do not drive yourself to the hospital.        Colitis    WHAT YOU NEED TO KNOW:    Colitis is swelling and irritation of your colon. Colitis may be caused by ulcers or a problem with your immune system. Bacteria, a virus, or a parasite may also cause colitis. The cause may not be known. You may have diarrhea, abdominal pain, fever, or blood or mucus in your bowel movement.    DISCHARGE INSTRUCTIONS:    Return to the emergency department if:   •You have sudden trouble breathing.      •Your bowel movements are black or have blood in them.      •You have blood in your vomit.      •You have severe abdominal pain or your abdomen is swollen and feels hard.      •You have any of the following signs of dehydration: ?Dizziness or weakness      ?Dry mouth, cracked lips, or severe thirst      ?Fast heartbeat or breathing      ?Urinating very little or not at all        Call your doctor if:   •Your symptoms get worse or do not go away.      •You have a fever, chills, cough, or feel weak and achy.      •You suddenly lose weight without trying.      •You have questions or concerns about your condition or care.      Medicines:   •Medicines may be given to decrease inflammation in your colon and treat diarrhea.      •Take your medicine as directed. Contact your healthcare provider if you think your medicine is not helping or if you have side effects. Tell him of her if you are allergic to any medicine. Keep a list of the medicines, vitamins, and herbs you take. Include the amounts, and when and why you take them. Bring the list or the pill bottles to follow-up visits. Carry your medicine list with you in case of an emergency.      Manage your symptoms:   •Drink liquids as directed to help prevent dehydration. Good liquids to drink include water, juice, and broth. Ask how much liquid to drink each day. You may need to drink an oral rehydration solution (ORS). An ORS contains a balance of water, salt, and sugar to replace body fluids lost during diarrhea.      •Eat a variety of healthy foods. Healthy foods include fruits, vegetables, whole-grain breads, beans, low-fat dairy products, lean meats, and fish. You may need to eat several small meals throughout the day instead of large meals. Avoid spicy foods, caffeine, chocolate, and foods high in fat.      •Talk to your healthcare provider before you take NSAIDs. NSAIDs can cause worsen your symptoms if ulcers are causing your colitis.      •Start to exercise when you feel better. Regular exercise helps your bowels work normally. Ask about the best exercise plan for you.      Prevent the spread of germs:          •Wash your hands often. Wash your hands several times each day. Wash after you use the bathroom, change a child's diaper, and before you prepare or eat food. Use soap and water every time. Rub your soapy hands together, lacing your fingers. Wash the front and back of your hands, and in between your fingers. Use the fingers of one hand to scrub under the fingernails of the other hand. Wash for at least 20 seconds. Rinse with warm, running water for several seconds. Then dry your hands with a clean towel or paper towel. Use hand  that contains alcohol if soap and water are not available. Do not touch your eyes, nose, or mouth without washing your hands first.  Handwashing           •Cover a sneeze or cough. Use a tissue that covers your mouth and nose. Throw the tissue away in a trash can right away. Use the bend of your arm if a tissue is not available. Wash your hands well with soap and water or use a hand .      •Clean surfaces often. Clean doorknobs, countertops, cell phones, and other surfaces that are touched often. Use a disinfecting wipe, a single-use sponge, or a cloth you can wash and reuse. Use disinfecting  if you do not have wipes. You can create a disinfecting  by mixing 1 part bleach with 10 parts water.      •Ask about vaccines you may need. Vaccines help prevent disease caused by some viruses and bacteria. Get the influenza (flu) vaccine as soon as recommended each year. The flu vaccine is usually available starting in September or October. Flu viruses change, so it is important to get a flu vaccine every year. Get the pneumonia vaccine if recommended. This vaccine is usually recommended every 5 years. Your provider will tell you when to get this vaccine, if needed. Your healthcare provider can tell you if you should get other vaccines, and when to get them.      Follow up with your doctor as directed: You may need to return for a colonoscopy or other tests. Write down how often you have a bowel movements and what they look like. Bring this to your follow-up visits. Write down your questions so you remember to ask them during your visits.       © Copyright [employer] 2021

## 2021-09-08 NOTE — ED PROVIDER NOTE - CLINICAL SUMMARY MEDICAL DECISION MAKING FREE TEXT BOX
Pt is an 86F with abdominal pain, diarrhea, dehydration. Will give normal saline IV, pepcid IV, zofran IV. Will check labs, CT abdomen/pel. Re-assess. Pt is an 86F with abdominal pain, diarrhea, dehydration. Will give normal saline IV, pepcid IV, zofran IV. Will check labs, CT abdomen/pel. Re-assess.  On re-evaluation, pt with decreased loose stool. Pt vitals wnl, tolerating PO. Instructions given to follow up with primary physician in 1-2 days, return to ED for increased pain, fever, vomiting, worsening condition.

## 2021-09-08 NOTE — ED PROVIDER NOTE - CARE PLAN
Principal Discharge DX:	Dehydration  Secondary Diagnosis:	Diarrhea  Secondary Diagnosis:	Abdominal pain   1

## 2021-09-08 NOTE — ED ADULT NURSE NOTE - ISOLATION TYPE:
RT contacted to make aware of patient requiring more O2 over the past hour. Patient currently on 13L oxymask. Patient unable to understand proning, writer attempted to have patient more on one side but patient went back to his back.    None

## 2021-09-08 NOTE — ED PROVIDER NOTE - CPE EDP GASTRO NORM
1. Continue current plan. Stable on current medication without adverse events. 2. Refill and adjust morphine ER 60 mg down to 30 mg every 8 hours x 1 month, then adjust down to 30 mg every 12 hours x 1 month, then adjust down to 15 mg every 8 hours until next visit. 3. Refill oxycodone 10 mg up to 3 times daily as needed times 1 month, then adjust up to 4 times daily as needed until next visit. 4. Continue Flexeril 10 mg. Please take half to 1 tablet up to 3 times daily as needed. 2 refills  5. Please remember to discuss alternative treatments regarding temazepam with your PCP as soon as possible as we were no longer be able to prescribe opioid medications while you are concurrently taking benzodiazepines. 6. Naloxone 4 mg nasal spray for opioid induced respiratory depression emergency only. 7. Continue home exercise program.    8. Discussed risks of addiction, dependency, and opioid induced hyperalgesia. Please remember to call at least 4-5 business days prior to your medication refill. Return to clinic in 3 months. Please call and cancel your appointment and pain management agreement if you do decide to transfer your care. - - -

## 2021-09-08 NOTE — ED ADULT TRIAGE NOTE - CHIEF COMPLAINT QUOTE
pt c/o nausea, diarrhea, and lower abdominal pain since last night. pt states " My   one month ago of the same symptoms." +HIV.

## 2021-09-10 ENCOUNTER — RX RENEWAL (OUTPATIENT)
Age: 86
End: 2021-09-10

## 2021-10-28 ENCOUNTER — OUTPATIENT (OUTPATIENT)
Dept: OUTPATIENT SERVICES | Facility: HOSPITAL | Age: 86
LOS: 1 days | End: 2021-10-28

## 2021-10-28 ENCOUNTER — APPOINTMENT (OUTPATIENT)
Dept: INFECTIOUS DISEASE | Facility: CLINIC | Age: 86
End: 2021-10-28
Payer: MEDICARE

## 2021-10-28 VITALS
DIASTOLIC BLOOD PRESSURE: 70 MMHG | OXYGEN SATURATION: 96 % | WEIGHT: 109.9 LBS | RESPIRATION RATE: 12 BRPM | BODY MASS INDEX: 20.1 KG/M2 | HEART RATE: 77 BPM | SYSTOLIC BLOOD PRESSURE: 150 MMHG | TEMPERATURE: 97.8 F

## 2021-10-28 DIAGNOSIS — Z23 ENCOUNTER FOR IMMUNIZATION: ICD-10-CM

## 2021-10-28 LAB
A1C WITH ESTIMATED AVERAGE GLUCOSE RESULT: 5.4 % — SIGNIFICANT CHANGE UP (ref 4–5.6)
CHOLEST SERPL-MCNC: 206 MG/DL — HIGH
ESTIMATED AVERAGE GLUCOSE: 108 MG/DL — SIGNIFICANT CHANGE UP (ref 68–114)
HDLC SERPL-MCNC: 95 MG/DL — SIGNIFICANT CHANGE UP
LIPID PNL WITH DIRECT LDL SERPL: 94 MG/DL — SIGNIFICANT CHANGE UP
NON HDL CHOLESTEROL: 111 MG/DL — SIGNIFICANT CHANGE UP
TRIGL SERPL-MCNC: 85 MG/DL — SIGNIFICANT CHANGE UP

## 2021-10-28 PROCEDURE — 99214 OFFICE O/P EST MOD 30 MIN: CPT | Mod: GC

## 2021-10-28 RX ORDER — ALPRAZOLAM 1 MG/1
1 TABLET ORAL
Refills: 0 | Status: DISCONTINUED | COMMUNITY
Start: 2020-11-12 | End: 2021-10-28

## 2021-10-29 LAB
4/8 RATIO: 0.66 RATIO — LOW (ref 0.86–4.14)
ABS CD8: 609 /UL — SIGNIFICANT CHANGE UP (ref 90–775)
CD3 BLASTS SPEC-ACNC: 1020 /UL — SIGNIFICANT CHANGE UP (ref 396–2024)
CD3 BLASTS SPEC-ACNC: 75 % — SIGNIFICANT CHANGE UP (ref 58–84)
CD4 %: 29 % — LOW (ref 30–56)
CD8 %: 45 % — HIGH (ref 11–43)
HIV-1 VIRAL LOAD RESULT: SIGNIFICANT CHANGE UP
HIV1 RNA # SERPL NAA+PROBE: SIGNIFICANT CHANGE UP COPIES/ML
HIV1 RNA SER-IMP: SIGNIFICANT CHANGE UP
HIV1 RNA SERPL NAA+PROBE-ACNC: SIGNIFICANT CHANGE UP
HIV1 RNA SERPL NAA+PROBE-LOG#: SIGNIFICANT CHANGE UP LG COP/ML
T-CELL CD4 SUBSET PNL BLD: 400 /UL — SIGNIFICANT CHANGE UP (ref 325–1251)

## 2021-11-01 DIAGNOSIS — E78.5 HYPERLIPIDEMIA, UNSPECIFIED: ICD-10-CM

## 2021-11-01 DIAGNOSIS — Z00.00 ENCOUNTER FOR GENERAL ADULT MEDICAL EXAMINATION WITHOUT ABNORMAL FINDINGS: ICD-10-CM

## 2021-11-01 DIAGNOSIS — B20 HUMAN IMMUNODEFICIENCY VIRUS [HIV] DISEASE: ICD-10-CM

## 2021-11-01 DIAGNOSIS — F41.9 ANXIETY DISORDER, UNSPECIFIED: ICD-10-CM

## 2021-11-02 RX ORDER — DOLUTEGRAVIR SODIUM AND RILPIVIRINE HYDROCHLORIDE 50; 25 MG/1; MG/1
50-25 TABLET, FILM COATED ORAL
Qty: 30 | Refills: 5 | Status: DISCONTINUED | COMMUNITY
Start: 2018-10-16 | End: 2021-11-02

## 2021-12-11 ENCOUNTER — EMERGENCY (EMERGENCY)
Facility: HOSPITAL | Age: 86
LOS: 1 days | Discharge: HOME | End: 2021-12-11
Attending: EMERGENCY MEDICINE | Admitting: EMERGENCY MEDICINE
Payer: MEDICARE

## 2021-12-11 VITALS
DIASTOLIC BLOOD PRESSURE: 81 MMHG | RESPIRATION RATE: 16 BRPM | TEMPERATURE: 98 F | SYSTOLIC BLOOD PRESSURE: 158 MMHG | OXYGEN SATURATION: 98 % | HEART RATE: 69 BPM

## 2021-12-11 VITALS
WEIGHT: 110.01 LBS | HEIGHT: 62 IN | HEART RATE: 73 BPM | OXYGEN SATURATION: 100 % | TEMPERATURE: 98 F | SYSTOLIC BLOOD PRESSURE: 186 MMHG | RESPIRATION RATE: 18 BRPM | DIASTOLIC BLOOD PRESSURE: 80 MMHG

## 2021-12-11 DIAGNOSIS — E78.5 HYPERLIPIDEMIA, UNSPECIFIED: ICD-10-CM

## 2021-12-11 DIAGNOSIS — M25.572 PAIN IN LEFT ANKLE AND JOINTS OF LEFT FOOT: ICD-10-CM

## 2021-12-11 DIAGNOSIS — Z21 ASYMPTOMATIC HUMAN IMMUNODEFICIENCY VIRUS [HIV] INFECTION STATUS: ICD-10-CM

## 2021-12-11 DIAGNOSIS — Z88.0 ALLERGY STATUS TO PENICILLIN: ICD-10-CM
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
## 2021-12-11 DIAGNOSIS — Z88.2 ALLERGY STATUS TO SULFONAMIDES: ICD-10-CM

## 2021-12-11 DIAGNOSIS — Z88.1 ALLERGY STATUS TO OTHER ANTIBIOTIC AGENTS STATUS: ICD-10-CM

## 2021-12-11 DIAGNOSIS — S90.02XA CONTUSION OF LEFT ANKLE, INITIAL ENCOUNTER: ICD-10-CM

## 2021-12-11 PROCEDURE — 73630 X-RAY EXAM OF FOOT: CPT | Mod: 26,LT

## 2021-12-11 PROCEDURE — 73610 X-RAY EXAM OF ANKLE: CPT | Mod: 26,LT

## 2021-12-11 PROCEDURE — 99284 EMERGENCY DEPT VISIT MOD MDM: CPT | Mod: 25

## 2021-12-11 PROCEDURE — 99283 EMERGENCY DEPT VISIT LOW MDM: CPT | Mod: 25

## 2021-12-11 PROCEDURE — 73630 X-RAY EXAM OF FOOT: CPT

## 2021-12-11 PROCEDURE — 73610 X-RAY EXAM OF ANKLE: CPT

## 2021-12-11 NOTE — ED PROVIDER NOTE - OBJECTIVE STATEMENT
86 F co fall 2-3 days ago- no head strike- no AC- no ha no n/v no f/c  px thinks she twisted her L ankle- though not having pain- ambulating on the ankle without sig difficulty  not taking any pain meds- mild mod severity  came to ED b/c she noted extensive swelling/bruising to the area  mild-mod severity  no sig exac/allev factors

## 2021-12-11 NOTE — ED ADULT NURSE NOTE - OBJECTIVE STATEMENT
Patient arrives ambulatory reporting left ankle injury that occurred 2 days ago, significant bruising noted to left ankle and left lower leg, some swelling noted to ankle.  Patient denies any other injury, states the fall occurred due to accidental trip and fall.

## 2021-12-11 NOTE — ED PROVIDER NOTE - PATIENT PORTAL LINK FT
You can access the FollowMyHealth Patient Portal offered by [facility] by registering at the following website: [email or web address]. By joining [facility]’s FollowMyHealth portal, you will also be able to view your health information using other applications (apps) compatible with our system.

## 2021-12-11 NOTE — ED ADULT TRIAGE NOTE - OTHER COMPLAINTS
patient noted to have ecchymosis to left leg and ankle. No use of blood thinners. Denies loc or head injury.

## 2021-12-11 NOTE — ED PROVIDER NOTE - MUSCULOSKELETAL, MLM
L lower leg + diffuse bruising to lower leg that cont to dorsum of foot and toes 2+ dp/pt pulses no bony ttp, mild lat mal swelling , sensation intact, foot warm

## 2021-12-11 NOTE — ED PROVIDER NOTE - NSFOLLOWUPINSTRUCTIONS_ED_ALL_ED_FT
Contusion      A contusion is a deep bruise. This is a result of an injury that causes bleeding under the skin. Symptoms of bruising include pain, swelling, and discolored skin. The skin may turn blue, purple, or yellow.      Follow these instructions at home:      Managing pain, stiffness, and swelling   You may use RICE. This stands for:  •Resting.      •Icing.      •Compression, or putting pressure.      •Elevating, or raising the injured area.    To follow this method, do these actions:  •Rest the injured area.    •If told, put ice on the injured area.  •Put ice in a plastic bag.      •Place a towel between your skin and the bag.      •Leave the ice on for 20 minutes, 2–3 times per day.        •If told, put light pressure (compression) on the injured area using an elastic bandage. Make sure the bandage is not too tight. If the area tingles or becomes numb, remove it and put it back on as told by your doctor.      •If possible, raise (elevate) the injured area above the level of your heart while you are sitting or lying down.      General instructions     •Take over-the-counter and prescription medicines only as told by your doctor.      •Keep all follow-up visits as told by your doctor. This is important.        Contact a doctor if:    •Your symptoms do not get better after several days of treatment.      •Your symptoms get worse.      •You have trouble moving the injured area.        Get help right away if:    •You have very bad pain.      •You have a loss of feeling (numbness) in a hand or foot.      •Your hand or foot turns pale or cold.        Summary    •A contusion is a deep bruise. This is a result of an injury that causes bleeding under the skin.      •Symptoms of bruising include pain, swelling, and discolored skin. The skin may turn blue, purple, or yellow.      •This condition is treated with rest, ice, compression, and elevation. This is also called RICE. You may be given over-the-counter medicines for pain.      •Contact a doctor if you do not feel better, or you feel worse. Get help right away if you have very bad pain, have lost feeling in a hand or foot, or the area turns pale or cold.      This information is not intended to replace advice given to you by your health care provider. Make sure you discuss any questions you have with your health care provider.      Document Revised: 08/09/2019 Document Reviewed: 08/09/2019    Elsevier Patient Education © 2021 [employer]

## 2021-12-11 NOTE — ED PROVIDER NOTE - CONDITION AT DISCHARGE:
[Current] : current smoker [_____ pack-years] : [unfilled] pack-years [TextBox_13] : She denies hemoptysis, denies new cough, denies unexplained weight loss.\par She is a current smoker with a 46 pack year smoking history (1PPD x 46 years).  She is referred by Dr. Gita Lisker.\par  Improved

## 2021-12-16 ENCOUNTER — APPOINTMENT (OUTPATIENT)
Dept: INFECTIOUS DISEASE | Facility: CLINIC | Age: 86
End: 2021-12-16
Payer: MEDICARE

## 2021-12-16 ENCOUNTER — OUTPATIENT (OUTPATIENT)
Dept: OUTPATIENT SERVICES | Facility: HOSPITAL | Age: 86
LOS: 1 days | End: 2021-12-16

## 2021-12-16 VITALS
WEIGHT: 116.5 LBS | RESPIRATION RATE: 14 BRPM | OXYGEN SATURATION: 97 % | SYSTOLIC BLOOD PRESSURE: 126 MMHG | BODY MASS INDEX: 21.31 KG/M2 | HEART RATE: 71 BPM | TEMPERATURE: 97.7 F | DIASTOLIC BLOOD PRESSURE: 80 MMHG

## 2021-12-16 DIAGNOSIS — Z00.00 ENCOUNTER FOR GENERAL ADULT MEDICAL EXAMINATION W/OUT ABNORMAL FINDINGS: ICD-10-CM

## 2021-12-16 LAB
ALBUMIN SERPL ELPH-MCNC: 4.5 G/DL — SIGNIFICANT CHANGE UP (ref 3.3–5)
ALP SERPL-CCNC: 69 U/L — SIGNIFICANT CHANGE UP (ref 40–120)
ALT FLD-CCNC: 15 U/L — SIGNIFICANT CHANGE UP (ref 10–45)
ANION GAP SERPL CALC-SCNC: 12 MMOL/L — SIGNIFICANT CHANGE UP (ref 5–17)
AST SERPL-CCNC: 26 U/L — SIGNIFICANT CHANGE UP (ref 10–40)
BILIRUB SERPL-MCNC: 0.6 MG/DL — SIGNIFICANT CHANGE UP (ref 0.2–1.2)
BUN SERPL-MCNC: 11 MG/DL — SIGNIFICANT CHANGE UP (ref 7–23)
CALCIUM SERPL-MCNC: 8.8 MG/DL — SIGNIFICANT CHANGE UP (ref 8.4–10.5)
CHLORIDE SERPL-SCNC: 100 MMOL/L — SIGNIFICANT CHANGE UP (ref 96–108)
CO2 SERPL-SCNC: 21 MMOL/L — LOW (ref 22–31)
CREAT SERPL-MCNC: 0.73 MG/DL — SIGNIFICANT CHANGE UP (ref 0.5–1.3)
GLUCOSE SERPL-MCNC: 90 MG/DL — SIGNIFICANT CHANGE UP (ref 70–99)
POTASSIUM SERPL-MCNC: 4.5 MMOL/L — SIGNIFICANT CHANGE UP (ref 3.5–5.3)
POTASSIUM SERPL-SCNC: 4.5 MMOL/L — SIGNIFICANT CHANGE UP (ref 3.5–5.3)
PROT SERPL-MCNC: 6.7 G/DL — SIGNIFICANT CHANGE UP (ref 6–8.3)
SODIUM SERPL-SCNC: 133 MMOL/L — LOW (ref 135–145)

## 2021-12-16 PROCEDURE — 99214 OFFICE O/P EST MOD 30 MIN: CPT | Mod: GC

## 2021-12-17 LAB
HIV-1 VIRAL LOAD RESULT: ABNORMAL
HIV1 RNA # SERPL NAA+PROBE: ABNORMAL COPIES/ML
HIV1 RNA SER-IMP: SIGNIFICANT CHANGE UP
HIV1 RNA SERPL NAA+PROBE-ACNC: ABNORMAL
HIV1 RNA SERPL NAA+PROBE-LOG#: ABNORMAL LG COP/ML

## 2021-12-20 DIAGNOSIS — E78.5 HYPERLIPIDEMIA, UNSPECIFIED: ICD-10-CM

## 2021-12-20 DIAGNOSIS — Z00.00 ENCOUNTER FOR GENERAL ADULT MEDICAL EXAMINATION WITHOUT ABNORMAL FINDINGS: ICD-10-CM

## 2021-12-20 DIAGNOSIS — F41.9 ANXIETY DISORDER, UNSPECIFIED: ICD-10-CM

## 2021-12-20 DIAGNOSIS — E78.1 PURE HYPERGLYCERIDEMIA: ICD-10-CM

## 2021-12-20 DIAGNOSIS — B20 HUMAN IMMUNODEFICIENCY VIRUS [HIV] DISEASE: ICD-10-CM

## 2022-01-01 ENCOUNTER — EMERGENCY (EMERGENCY)
Facility: HOSPITAL | Age: 87
LOS: 1 days | Discharge: HOME | End: 2022-01-01
Attending: EMERGENCY MEDICINE | Admitting: EMERGENCY MEDICINE
Payer: MEDICARE

## 2022-01-01 VITALS
HEIGHT: 62 IN | DIASTOLIC BLOOD PRESSURE: 77 MMHG | HEART RATE: 82 BPM | OXYGEN SATURATION: 97 % | RESPIRATION RATE: 16 BRPM | WEIGHT: 110.01 LBS | TEMPERATURE: 98 F | SYSTOLIC BLOOD PRESSURE: 167 MMHG

## 2022-01-01 DIAGNOSIS — Z21 ASYMPTOMATIC HUMAN IMMUNODEFICIENCY VIRUS [HIV] INFECTION STATUS: ICD-10-CM

## 2022-01-01 DIAGNOSIS — Z88.1 ALLERGY STATUS TO OTHER ANTIBIOTIC AGENTS STATUS: ICD-10-CM

## 2022-01-01 DIAGNOSIS — I10 ESSENTIAL (PRIMARY) HYPERTENSION: ICD-10-CM

## 2022-01-01 DIAGNOSIS — H53.9 UNSPECIFIED VISUAL DISTURBANCE: ICD-10-CM

## 2022-01-01 DIAGNOSIS — Z88.0 ALLERGY STATUS TO PENICILLIN: ICD-10-CM

## 2022-01-01 DIAGNOSIS — H43.12 VITREOUS HEMORRHAGE, LEFT EYE: ICD-10-CM

## 2022-01-01 DIAGNOSIS — Z88.2 ALLERGY STATUS TO SULFONAMIDES: ICD-10-CM

## 2022-01-01 PROCEDURE — 99283 EMERGENCY DEPT VISIT LOW MDM: CPT

## 2022-01-01 PROCEDURE — 99284 EMERGENCY DEPT VISIT MOD MDM: CPT

## 2022-01-01 RX ORDER — PHENYLEPHRINE HCL 2.5 %
3 DROPS OPHTHALMIC (EYE) ONCE
Refills: 0 | Status: COMPLETED | OUTPATIENT
Start: 2022-01-01 | End: 2022-01-01

## 2022-01-01 RX ORDER — TROPICAMIDE 1 %
3 DROPS OPHTHALMIC (EYE) ONCE
Refills: 0 | Status: COMPLETED | OUTPATIENT
Start: 2022-01-01 | End: 2022-01-01

## 2022-01-01 RX ADMIN — Medication 3 DROP(S): at 16:49

## 2022-01-01 RX ADMIN — Medication 3 DROP(S): at 16:51

## 2022-01-01 NOTE — ED PROVIDER NOTE - NSFOLLOWUPCLINICS_GEN_ALL_ED_FT
[facility]  Ophthalmology  [address] [place] [address]  Phone: [phone]  Fax:

## 2022-01-01 NOTE — ED PROVIDER NOTE - PHYSICAL EXAMINATION
VITAL SIGNS: I have reviewed nursing notes and confirm.  CONSTITUTIONAL: Well-developed; well-nourished; in no acute distress.   SKIN:  warm and dry, no acute rash.   HEAD:  normocephalic, atraumatic.  EYES: PERRL, EOM intact; conjunctiva and sclera clear. nl lids/lashes.  L cornea clear w/o fluorescein uptake.  visual fields full to confrontation.  R 20/30, L 20/50.  unable to visualize fundi.  Slit lamp not functioning for full SLE  ENT: No nasal discharge; airway clear.   NECK: Supple;     RESP:  nl resp effort   NEURO: awake, alert, oriented x 3, CN II-XII grossly intact, motor 5/5, no gross sens deficits, gait steady,  speech clear.   PSYCH: Cooperative, mood and affect appropriate.

## 2022-01-01 NOTE — ED ADULT TRIAGE NOTE - CHIEF COMPLAINT QUOTE
Pt presents to the ED c/o vision change. Pt states, "Last night around 4 PM my left eye started flashing and then slowly felt like my vision was going." Denies focal weakness or numbness, NVD, CP, headache, dizziness, and any other sx.

## 2022-01-01 NOTE — ED PROVIDER NOTE - PROGRESS NOTE DETAILS
Jenifer consulted and coming to eval pt in ed, requests dilating drops for pt. Contacted by nadine baez has been delayed and will be here in ~ 1 h.  Pt updated and expressed understanding. Eval by ophtho - pt w vitreous hemorrhage; recommend fu w retina on Mon.  Discussed w pt who expressed understanding and agreement.

## 2022-01-01 NOTE — ED PROVIDER NOTE - NSFOLLOWUPINSTRUCTIONS_ED_ALL_ED_FT
Vitreous hemorrhage    Please follow up with a retina specialist on Monday - call at 9am for a same day appointment; call [phone].    Return for worsening pain, vision loss, any other concerns.

## 2022-01-01 NOTE — ED PROVIDER NOTE - CLINICAL SUMMARY MEDICAL DECISION MAKING FREE TEXT BOX
Pt c/o vision loss L eye preceded by flashes - ? retinal detachment, ? vitreous detachment or hemorrhage, less concern for arterial/venous occlusion.  Plan ophtho consult.   Dispo per ophtho.

## 2022-01-01 NOTE — ED ADULT NURSE NOTE - OBJECTIVE STATEMENT
pt stated that around 4pm yesterday, left eye started to have flashes and diminished vision. denies fever, drainage, injury

## 2022-01-01 NOTE — CONSULT NOTE ADULT - SUBJECTIVE AND OBJECTIVE BOX
85 yo F with 1 day history floaters and blurry vision, left eye. Denies pain or flashes     POH: cataract surgery OU   PMH: HIV+ since 1989, on HAART   FH: denies for eye pathology   ROS: 10 pt ROS is negative other than above   SH: no smoking   All: multiple drug allergies per EHR     Eye Exam:   VAsc(distance): 20/30 OD and ~ 20/80 OS   Pupils: pharm dilated   Malcolm: STP OU   EOM: full OU   CVF: constricted OS     External: wnl OU   Lids/Lashes: wnl OU   Conj/sclera: white and quiet OU   Cornea: SPK OU   AC: deep and quiet OU   Iris: round and formed OU   Lens: PCIOL OU   Vitreous: PVD OU, vitreous hemorrhage OS   Optic nerves: sharp and pink OU, CDR ~0.2 OU   Macula: myopic fundus OU, flat macula OU   Vessels: normal caliber and tortuosity OU   Periphery: flat and attached 360 OU

## 2022-01-01 NOTE — ED PROVIDER NOTE - PATIENT PORTAL LINK FT
You can access the FollowMyHealth Patient Portal offered by [facility] by registering at the following website: [email or web address]. By joining [employer]’s FollowMyHealth portal, you will also be able to view your health information using other applications (apps) compatible with our system.

## 2022-01-01 NOTE — CONSULT NOTE ADULT - ASSESSMENT
86 F with vitreous hemorrhage, left eye.   - no retinal breaks 360, retina otherwise flat and attached   - RD precautions explained to patient   - no HIV retinopathy   - pseudophakic OU- monitor   - JORGE- artificial tears QID OU PRN   - pt to follow up with [facility] retina dept Monday morning, contact info given to patient

## 2022-02-17 ENCOUNTER — APPOINTMENT (OUTPATIENT)
Dept: INFECTIOUS DISEASE | Facility: CLINIC | Age: 87
End: 2022-02-17
Payer: MEDICARE

## 2022-02-17 ENCOUNTER — OUTPATIENT (OUTPATIENT)
Dept: OUTPATIENT SERVICES | Facility: HOSPITAL | Age: 87
LOS: 1 days | End: 2022-02-17

## 2022-02-17 VITALS
BODY MASS INDEX: 21.03 KG/M2 | RESPIRATION RATE: 14 BRPM | SYSTOLIC BLOOD PRESSURE: 128 MMHG | TEMPERATURE: 97.6 F | HEART RATE: 60 BPM | OXYGEN SATURATION: 95 % | WEIGHT: 115 LBS | DIASTOLIC BLOOD PRESSURE: 78 MMHG

## 2022-02-17 DIAGNOSIS — E87.1 HYPO-OSMOLALITY AND HYPONATREMIA: ICD-10-CM

## 2022-02-17 DIAGNOSIS — Z00.00 ENCOUNTER FOR GENERAL ADULT MEDICAL EXAMINATION W/OUT ABNORMAL FINDINGS: ICD-10-CM

## 2022-02-17 LAB
ALBUMIN SERPL ELPH-MCNC: 4.5 G/DL — SIGNIFICANT CHANGE UP (ref 3.3–5)
ALP SERPL-CCNC: 69 U/L — SIGNIFICANT CHANGE UP (ref 40–120)
ALT FLD-CCNC: 12 U/L — SIGNIFICANT CHANGE UP (ref 10–45)
ANION GAP SERPL CALC-SCNC: 12 MMOL/L — SIGNIFICANT CHANGE UP (ref 5–17)
AST SERPL-CCNC: 17 U/L — SIGNIFICANT CHANGE UP (ref 10–40)
BASOPHILS # BLD AUTO: 0.04 K/UL — SIGNIFICANT CHANGE UP (ref 0–0.2)
BASOPHILS NFR BLD AUTO: 0.6 % — SIGNIFICANT CHANGE UP (ref 0–2)
BILIRUB SERPL-MCNC: 0.4 MG/DL — SIGNIFICANT CHANGE UP (ref 0.2–1.2)
BUN SERPL-MCNC: 13 MG/DL — SIGNIFICANT CHANGE UP (ref 7–23)
CALCIUM SERPL-MCNC: 9.3 MG/DL — SIGNIFICANT CHANGE UP (ref 8.4–10.5)
CHLORIDE SERPL-SCNC: 96 MMOL/L — SIGNIFICANT CHANGE UP (ref 96–108)
CO2 SERPL-SCNC: 25 MMOL/L — SIGNIFICANT CHANGE UP (ref 22–31)
CREAT SERPL-MCNC: 0.71 MG/DL — SIGNIFICANT CHANGE UP (ref 0.5–1.3)
EOSINOPHIL # BLD AUTO: 0.05 K/UL — SIGNIFICANT CHANGE UP (ref 0–0.5)
EOSINOPHIL NFR BLD AUTO: 0.8 % — SIGNIFICANT CHANGE UP (ref 0–6)
GLUCOSE SERPL-MCNC: 85 MG/DL — SIGNIFICANT CHANGE UP (ref 70–99)
HCT VFR BLD CALC: 40.8 % — SIGNIFICANT CHANGE UP (ref 34.5–45)
HGB BLD-MCNC: 13.2 G/DL — SIGNIFICANT CHANGE UP (ref 11.5–15.5)
IMM GRANULOCYTES NFR BLD AUTO: 0.3 % — SIGNIFICANT CHANGE UP (ref 0–1.5)
LYMPHOCYTES # BLD AUTO: 1.95 K/UL — SIGNIFICANT CHANGE UP (ref 1–3.3)
LYMPHOCYTES # BLD AUTO: 30.3 % — SIGNIFICANT CHANGE UP (ref 13–44)
MCHC RBC-ENTMCNC: 32.4 GM/DL — SIGNIFICANT CHANGE UP (ref 32–36)
MCHC RBC-ENTMCNC: 32.7 PG — SIGNIFICANT CHANGE UP (ref 27–34)
MCV RBC AUTO: 101 FL — HIGH (ref 80–100)
MONOCYTES # BLD AUTO: 0.59 K/UL — SIGNIFICANT CHANGE UP (ref 0–0.9)
MONOCYTES NFR BLD AUTO: 9.2 % — SIGNIFICANT CHANGE UP (ref 2–14)
NEUTROPHILS # BLD AUTO: 3.78 K/UL — SIGNIFICANT CHANGE UP (ref 1.8–7.4)
NEUTROPHILS NFR BLD AUTO: 58.8 % — SIGNIFICANT CHANGE UP (ref 43–77)
NRBC # BLD: 0 /100 WBCS — SIGNIFICANT CHANGE UP (ref 0–0)
PLATELET # BLD AUTO: 251 K/UL — SIGNIFICANT CHANGE UP (ref 150–400)
POTASSIUM SERPL-MCNC: 4.7 MMOL/L — SIGNIFICANT CHANGE UP (ref 3.5–5.3)
POTASSIUM SERPL-SCNC: 4.7 MMOL/L — SIGNIFICANT CHANGE UP (ref 3.5–5.3)
PROT SERPL-MCNC: 6.5 G/DL — SIGNIFICANT CHANGE UP (ref 6–8.3)
RBC # BLD: 4.04 M/UL — SIGNIFICANT CHANGE UP (ref 3.8–5.2)
RBC # FLD: 13.1 % — SIGNIFICANT CHANGE UP (ref 10.3–14.5)
SODIUM SERPL-SCNC: 133 MMOL/L — LOW (ref 135–145)
TSH SERPL-MCNC: 3.65 UIU/ML — SIGNIFICANT CHANGE UP (ref 0.27–4.2)
WBC # BLD: 6.43 K/UL — SIGNIFICANT CHANGE UP (ref 3.8–10.5)
WBC # FLD AUTO: 6.43 K/UL — SIGNIFICANT CHANGE UP (ref 3.8–10.5)

## 2022-02-17 PROCEDURE — 99213 OFFICE O/P EST LOW 20 MIN: CPT | Mod: GC

## 2022-02-18 LAB
4/8 RATIO: 0.73 RATIO — LOW (ref 0.86–4.14)
ABS CD8: 758 /UL — SIGNIFICANT CHANGE UP (ref 90–775)
C TRACH RRNA SPEC QL NAA+PROBE: SIGNIFICANT CHANGE UP
CD3 BLASTS SPEC-ACNC: 1321 /UL — SIGNIFICANT CHANGE UP (ref 396–2024)
CD3 BLASTS SPEC-ACNC: 74 % — SIGNIFICANT CHANGE UP (ref 58–84)
CD4 %: 31 % — SIGNIFICANT CHANGE UP (ref 30–56)
CD8 %: 43 % — SIGNIFICANT CHANGE UP (ref 11–43)
N GONORRHOEA RRNA SPEC QL NAA+PROBE: SIGNIFICANT CHANGE UP
SPECIMEN SOURCE: SIGNIFICANT CHANGE UP
T PALLIDUM AB TITR SER: NEGATIVE — SIGNIFICANT CHANGE UP
T-CELL CD4 SUBSET PNL BLD: 554 /UL — SIGNIFICANT CHANGE UP (ref 325–1251)

## 2022-02-19 LAB
HIV-1 VIRAL LOAD RESULT: SIGNIFICANT CHANGE UP
HIV1 RNA # SERPL NAA+PROBE: SIGNIFICANT CHANGE UP COPIES/ML
HIV1 RNA SER-IMP: SIGNIFICANT CHANGE UP
HIV1 RNA SERPL NAA+PROBE-ACNC: SIGNIFICANT CHANGE UP
HIV1 RNA SERPL NAA+PROBE-LOG#: SIGNIFICANT CHANGE UP LG COP/ML

## 2022-02-22 DIAGNOSIS — Z00.00 ENCOUNTER FOR GENERAL ADULT MEDICAL EXAMINATION WITHOUT ABNORMAL FINDINGS: ICD-10-CM

## 2022-02-22 DIAGNOSIS — Z13.820 ENCOUNTER FOR SCREENING FOR OSTEOPOROSIS: ICD-10-CM

## 2022-02-22 DIAGNOSIS — B20 HUMAN IMMUNODEFICIENCY VIRUS [HIV] DISEASE: ICD-10-CM

## 2022-02-22 DIAGNOSIS — E87.1 HYPO-OSMOLALITY AND HYPONATREMIA: ICD-10-CM

## 2022-03-04 ENCOUNTER — OUTPATIENT (OUTPATIENT)
Dept: OUTPATIENT SERVICES | Facility: HOSPITAL | Age: 87
LOS: 1 days | End: 2022-03-04
Payer: MEDICARE

## 2022-03-04 ENCOUNTER — APPOINTMENT (OUTPATIENT)
Dept: RADIOLOGY | Facility: HOSPITAL | Age: 87
End: 2022-03-04

## 2022-03-04 ENCOUNTER — RESULT REVIEW (OUTPATIENT)
Age: 87
End: 2022-03-04

## 2022-03-04 PROCEDURE — 77085 DXA BONE DENSITY AXL VRT FX: CPT

## 2022-03-08 ENCOUNTER — NON-APPOINTMENT (OUTPATIENT)
Age: 87
End: 2022-03-08

## 2022-03-22 ENCOUNTER — INPATIENT (INPATIENT)
Facility: HOSPITAL | Age: 87
LOS: 2 days | Discharge: HOME | DRG: 41 | End: 2022-03-25
Attending: PSYCHIATRY & NEUROLOGY | Admitting: PSYCHIATRY & NEUROLOGY
Payer: MEDICARE

## 2022-03-22 VITALS
DIASTOLIC BLOOD PRESSURE: 70 MMHG | RESPIRATION RATE: 18 BRPM | HEART RATE: 63 BPM | WEIGHT: 115.08 LBS | SYSTOLIC BLOOD PRESSURE: 143 MMHG | OXYGEN SATURATION: 97 % | TEMPERATURE: 98 F | HEIGHT: 62 IN

## 2022-03-22 LAB
ALBUMIN SERPL ELPH-MCNC: 4.2 G/DL — SIGNIFICANT CHANGE UP (ref 3.3–5)
ALP SERPL-CCNC: 70 U/L — SIGNIFICANT CHANGE UP (ref 40–120)
ALT FLD-CCNC: 12 U/L — SIGNIFICANT CHANGE UP (ref 10–45)
ANION GAP SERPL CALC-SCNC: 11 MMOL/L — SIGNIFICANT CHANGE UP (ref 5–17)
APPEARANCE UR: CLEAR — SIGNIFICANT CHANGE UP
APTT BLD: 32.9 SEC — SIGNIFICANT CHANGE UP (ref 27.5–35.5)
AST SERPL-CCNC: 18 U/L — SIGNIFICANT CHANGE UP (ref 10–40)
BACTERIA # UR AUTO: PRESENT /HPF
BASOPHILS # BLD AUTO: 0.04 K/UL — SIGNIFICANT CHANGE UP (ref 0–0.2)
BASOPHILS NFR BLD AUTO: 0.6 % — SIGNIFICANT CHANGE UP (ref 0–2)
BILIRUB SERPL-MCNC: 0.3 MG/DL — SIGNIFICANT CHANGE UP (ref 0.2–1.2)
BILIRUB UR-MCNC: NEGATIVE — SIGNIFICANT CHANGE UP
BUN SERPL-MCNC: 15 MG/DL — SIGNIFICANT CHANGE UP (ref 7–23)
CALCIUM SERPL-MCNC: 9 MG/DL — SIGNIFICANT CHANGE UP (ref 8.4–10.5)
CHLORIDE SERPL-SCNC: 95 MMOL/L — LOW (ref 96–108)
CO2 SERPL-SCNC: 23 MMOL/L — SIGNIFICANT CHANGE UP (ref 22–31)
COLOR SPEC: YELLOW — SIGNIFICANT CHANGE UP
CREAT SERPL-MCNC: 0.84 MG/DL — SIGNIFICANT CHANGE UP (ref 0.5–1.3)
DIFF PNL FLD: NEGATIVE — SIGNIFICANT CHANGE UP
EGFR: 68 ML/MIN/1.73M2 — SIGNIFICANT CHANGE UP
EOSINOPHIL # BLD AUTO: 0.08 K/UL — SIGNIFICANT CHANGE UP (ref 0–0.5)
EOSINOPHIL NFR BLD AUTO: 1.3 % — SIGNIFICANT CHANGE UP (ref 0–6)
EPI CELLS # UR: SIGNIFICANT CHANGE UP /HPF (ref 0–5)
GLUCOSE SERPL-MCNC: 102 MG/DL — HIGH (ref 70–99)
GLUCOSE UR QL: NEGATIVE — SIGNIFICANT CHANGE UP
HCT VFR BLD CALC: 38.8 % — SIGNIFICANT CHANGE UP (ref 34.5–45)
HGB BLD-MCNC: 13.1 G/DL — SIGNIFICANT CHANGE UP (ref 11.5–15.5)
IMM GRANULOCYTES NFR BLD AUTO: 0.5 % — SIGNIFICANT CHANGE UP (ref 0–1.5)
INR BLD: 0.93 — SIGNIFICANT CHANGE UP (ref 0.88–1.16)
KETONES UR-MCNC: NEGATIVE — SIGNIFICANT CHANGE UP
LEUKOCYTE ESTERASE UR-ACNC: ABNORMAL
LYMPHOCYTES # BLD AUTO: 1.54 K/UL — SIGNIFICANT CHANGE UP (ref 1–3.3)
LYMPHOCYTES # BLD AUTO: 24.1 % — SIGNIFICANT CHANGE UP (ref 13–44)
MCHC RBC-ENTMCNC: 33.8 GM/DL — SIGNIFICANT CHANGE UP (ref 32–36)
MCHC RBC-ENTMCNC: 34 PG — SIGNIFICANT CHANGE UP (ref 27–34)
MCV RBC AUTO: 100.8 FL — HIGH (ref 80–100)
MONOCYTES # BLD AUTO: 0.71 K/UL — SIGNIFICANT CHANGE UP (ref 0–0.9)
MONOCYTES NFR BLD AUTO: 11.1 % — SIGNIFICANT CHANGE UP (ref 2–14)
NEUTROPHILS # BLD AUTO: 3.98 K/UL — SIGNIFICANT CHANGE UP (ref 1.8–7.4)
NEUTROPHILS NFR BLD AUTO: 62.4 % — SIGNIFICANT CHANGE UP (ref 43–77)
NITRITE UR-MCNC: NEGATIVE — SIGNIFICANT CHANGE UP
NRBC # BLD: 0 /100 WBCS — SIGNIFICANT CHANGE UP (ref 0–0)
PH UR: 7 — SIGNIFICANT CHANGE UP (ref 5–8)
PLATELET # BLD AUTO: 239 K/UL — SIGNIFICANT CHANGE UP (ref 150–400)
POTASSIUM SERPL-MCNC: 4.3 MMOL/L — SIGNIFICANT CHANGE UP (ref 3.5–5.3)
POTASSIUM SERPL-SCNC: 4.3 MMOL/L — SIGNIFICANT CHANGE UP (ref 3.5–5.3)
PROT SERPL-MCNC: 6.3 G/DL — SIGNIFICANT CHANGE UP (ref 6–8.3)
PROT UR-MCNC: NEGATIVE MG/DL — SIGNIFICANT CHANGE UP
PROTHROM AB SERPL-ACNC: 11 SEC — SIGNIFICANT CHANGE UP (ref 10.5–13.4)
RBC # BLD: 3.85 M/UL — SIGNIFICANT CHANGE UP (ref 3.8–5.2)
RBC # FLD: 12.1 % — SIGNIFICANT CHANGE UP (ref 10.3–14.5)
RBC CASTS # UR COMP ASSIST: < 5 /HPF — SIGNIFICANT CHANGE UP
SARS-COV-2 RNA SPEC QL NAA+PROBE: SIGNIFICANT CHANGE UP
SODIUM SERPL-SCNC: 129 MMOL/L — LOW (ref 135–145)
SP GR SPEC: <=1.005 — SIGNIFICANT CHANGE UP (ref 1–1.03)
TROPONIN T SERPL-MCNC: 0.01 NG/ML — SIGNIFICANT CHANGE UP (ref 0–0.01)
UROBILINOGEN FLD QL: 0.2 E.U./DL — SIGNIFICANT CHANGE UP
WBC # BLD: 6.38 K/UL — SIGNIFICANT CHANGE UP (ref 3.8–10.5)
WBC # FLD AUTO: 6.38 K/UL — SIGNIFICANT CHANGE UP (ref 3.8–10.5)
WBC UR QL: < 5 /HPF — SIGNIFICANT CHANGE UP

## 2022-03-22 PROCEDURE — 70551 MRI BRAIN STEM W/O DYE: CPT | Mod: 26

## 2022-03-22 PROCEDURE — 70496 CT ANGIOGRAPHY HEAD: CPT | Mod: 26,MA

## 2022-03-22 PROCEDURE — 70498 CT ANGIOGRAPHY NECK: CPT | Mod: 26,MA

## 2022-03-22 PROCEDURE — 99291 CRITICAL CARE FIRST HOUR: CPT

## 2022-03-22 PROCEDURE — 0042T: CPT

## 2022-03-22 PROCEDURE — 93010 ELECTROCARDIOGRAM REPORT: CPT

## 2022-03-22 RX ORDER — ENOXAPARIN SODIUM 100 MG/ML
40 INJECTION SUBCUTANEOUS EVERY 24 HOURS
Refills: 0 | Status: DISCONTINUED | OUTPATIENT
Start: 2022-03-22 | End: 2022-03-25

## 2022-03-22 RX ORDER — ATORVASTATIN CALCIUM 80 MG/1
80 TABLET, FILM COATED ORAL AT BEDTIME
Refills: 0 | Status: DISCONTINUED | OUTPATIENT
Start: 2022-03-22 | End: 2022-03-23

## 2022-03-22 RX ORDER — CLOPIDOGREL BISULFATE 75 MG/1
75 TABLET, FILM COATED ORAL DAILY
Refills: 0 | Status: DISCONTINUED | OUTPATIENT
Start: 2022-03-22 | End: 2022-03-25

## 2022-03-22 RX ORDER — ASPIRIN/CALCIUM CARB/MAGNESIUM 324 MG
81 TABLET ORAL DAILY
Refills: 0 | Status: DISCONTINUED | OUTPATIENT
Start: 2022-03-22 | End: 2022-03-25

## 2022-03-22 RX ORDER — DOLUTEGRAVIR SODIUM AND RILPIVIRINE HYDROCHLORIDE 50; 25 MG/1; MG/1
1 TABLET, FILM COATED ORAL
Qty: 0 | Refills: 0 | DISCHARGE

## 2022-03-22 RX ORDER — ALPRAZOLAM 0.25 MG
5 TABLET ORAL
Qty: 0 | Refills: 0 | DISCHARGE

## 2022-03-22 RX ADMIN — Medication 81 MILLIGRAM(S): at 21:03

## 2022-03-22 RX ADMIN — ATORVASTATIN CALCIUM 80 MILLIGRAM(S): 80 TABLET, FILM COATED ORAL at 21:03

## 2022-03-22 RX ADMIN — ENOXAPARIN SODIUM 40 MILLIGRAM(S): 100 INJECTION SUBCUTANEOUS at 21:03

## 2022-03-22 NOTE — H&P ADULT - HISTORY OF PRESENT ILLNESS
**STROKE HPI***    HPI: 87y Female with PMHx of HLD (on home statin), HIV (on home ARV) presents to  ED today. Explains at 3:30pm today, she was on the phone and had word finding difficulty that lasted 30 mins.     PAST MEDICAL & SURGICAL HISTORY:  HIV (human immunodeficiency virus infection)    HLD (hyperlipidemia)    No significant past surgical history        FAMILY HISTORY:      SOCIAL HISTORY:   Patient lives with *** at ***.   Smoking status:  Drinking:  Drug Use:     ROS: ***  Constitutional: No fever, weight loss or fatigue  Eyes: No eye pain, visual disturbances, or discharge  ENMT:  No difficulty hearing, tinnitus, vertigo; No sinus or throat pain  Neck: No pain or stiffness  Respiratory: No cough, wheezing, chills or hemoptysis  Cardiovascular: No chest pain, palpitations, shortness of breath, dizziness or leg swelling  Gastrointestinal: No abdominal pain. No nausea, vomiting or hematemesis; No diarrhea or constipation. Nohematochezia.  Genitourinary: No dysuria, frequency, hematuria or incontinence  Neurological: As per HPI  Skin: No itching, burning, rashes or lesions   Endocrine: No heat or cold intolerance; No hair loss  Musculoskeletal: No joint pain or swelling; No muscle, back or extremity pain  Psychiatric: No depression, anxiety, mood swings or difficulty sleeping  Heme/Lymph: No easy bruising or bleeding gums    T(C): 36.5 (03-22-22 @ 16:58), Max: 36.5 (03-22-22 @ 16:58)  HR: 60 (03-22-22 @ 18:03) (60 - 63)  BP: 168/74 (03-22-22 @ 18:03) (143/70 - 168/74)  RR: 18 (03-22-22 @ 18:03) (18 - 18)  SpO2: 97% (03-22-22 @ 18:03) (97% - 97%)    MEDICATION RECONCILIATION   MEDICATIONS  (STANDING):    MEDICATIONS  (PRN):    Allergies    Paxil (Unknown)  penicillins (Unknown)  sulfa drugs (Rash)  Terramycin (Unknown)  vancomycin (Rash)    Intolerances      Vital Signs Last 24 Hrs  T(C): 36.5 (22 Mar 2022 16:58), Max: 36.5 (22 Mar 2022 16:58)  T(F): 97.7 (22 Mar 2022 16:58), Max: 97.7 (22 Mar 2022 16:58)  HR: 60 (22 Mar 2022 18:03) (60 - 63)  BP: 168/74 (22 Mar 2022 18:03) (143/70 - 168/74)  BP(mean): --  RR: 18 (22 Mar 2022 18:03) (18 - 18)  SpO2: 97% (22 Mar 2022 18:03) (97% - 97%)    Physical exam:  General: No acute distress, awake and alert  Cardiovascular: Regular rate and rhythm, no murmurs, rubs, or gallops. No bruits  Pulmonary: Anterior breath sounds clear bilaterally, no crackles or wheezing. No use of accessory muscles  GI: Abdomen soft, non-tender, non-distended    Neurologic:  -Mental status: Awake, alert, oriented to person, place, and time. Speech is fluent with intact naming, repetition, and comprehension, no dysarthria. Recent and remote memory intact. Follows commands. Attention/concentration intact. Fund of knowledge appropriate.  -Cranial nerves:   II: Visual fields are full to confrontation.  III, IV, VI: Extraocular movements are intact without nystagmus. Pupils equally round and reactive to light  V:  Facial sensation V1-V3 equal and intact   VII: Face is symmetric with normal eye closure and smile  VIII: Hearing is bilaterally intact to finger rub  IX, X: Uvula is midline and soft palate rises symmetrically  XI: Head turning and shoulder shrug are intact.  XII: Tongue protrudes midline  Motor: Normal bulk and tone. No pronator drift. Strength bilateral upper extremity 5/5, bilateral lower extremities 5/5.  Rapid alternating movements intact and symmetric  Sensation: Intact to light touch bilaterally. No neglect or extinction on double simultaneous testing.  Coordination: No dysmetria on finger-to-nose and heel-to-shin bilaterally  Reflexes: Downgoing toes bilaterally   Gait: Narrow gait and steady    NIHSS: **** ASPECT Score: *** ICH Score: *** (GCS)    Fingerstick Blood Glucose: CAPILLARY BLOOD GLUCOSE  197 (22 Mar 2022 17:10)      POCT Blood Glucose.: 109 mg/dL (22 Mar 2022 16:58)    LABS:                        13.1   6.38  )-----------( 239      ( 22 Mar 2022 17:39 )             38.8     03-22    129<L>  |  95<L>  |  15  ----------------------------<  102<H>  4.3   |  23  |  0.84    Ca    9.0      22 Mar 2022 17:39    TPro  6.3  /  Alb  4.2  /  TBili  0.3  /  DBili  x   /  AST  18  /  ALT  12  /  AlkPhos  70  03-22    PT/INR - ( 22 Mar 2022 17:39 )   PT: 11.0 sec;   INR: 0.93          PTT - ( 22 Mar 2022 17:39 )  PTT:32.9 sec  CARDIAC MARKERS ( 22 Mar 2022 17:39 )  x     / 0.01 ng/mL / x     / x     / x              RADIOLOGY & ADDITIONAL STUDIES:    HCT:     CTA:    -----------------------------------------------------------------------------------------    ASSESSMENT/PLAN:    87y Female w/ PMH ***. HCT showed ***. CTA showed ***. Given *** tPA was ***. Patient was admitted to **** for further workup    **STROKE HPI***    HPI: 87y Female with PMHx of HLD (on home statin), HIV (on home ARV) presents to  ED today. Explains at 3:30pm today, she was on the phone and had word finding difficulty that lasted 30 mins then resolved. Symptoms had resolved in the ED. Stroke code called. Denied headache, slurred speech, new onset weakness or numbness, vision loss, facial droop, unsteady gait.     PAST MEDICAL & SURGICAL HISTORY:  HIV (human immunodeficiency virus infection)    HLD (hyperlipidemia)    No significant past surgical history    T(C): 36.5 (03-22-22 @ 16:58), Max: 36.5 (03-22-22 @ 16:58)  HR: 60 (03-22-22 @ 18:03) (60 - 63)  BP: 168/74 (03-22-22 @ 18:03) (143/70 - 168/74)  RR: 18 (03-22-22 @ 18:03) (18 - 18)  SpO2: 97% (03-22-22 @ 18:03) (97% - 97%)    Vital Signs Last 24 Hrs  T(C): 36.5 (22 Mar 2022 16:58), Max: 36.5 (22 Mar 2022 16:58)  T(F): 97.7 (22 Mar 2022 16:58), Max: 97.7 (22 Mar 2022 16:58)  HR: 60 (22 Mar 2022 18:03) (60 - 63)  BP: 168/74 (22 Mar 2022 18:03) (143/70 - 168/74)  BP(mean): --  RR: 18 (22 Mar 2022 18:03) (18 - 18)  SpO2: 97% (22 Mar 2022 18:03) (97% - 97%)    NIHSS: 0

## 2022-03-22 NOTE — H&P ADULT - NSHPREVIEWOFSYSTEMS_GEN_ALL_CORE
ROS:   Constitutional: No fever, weight loss or fatigue  Eyes: No eye pain, visual disturbances, or discharge  Neck: No pain or stiffness  Neurological: As per HPI

## 2022-03-22 NOTE — H&P ADULT - NSHPLABSRESULTS_GEN_ALL_CORE
LABS:           Fingerstick Blood Glucose: CAPILLARY BLOOD GLUCOSE  197 (22 Mar 2022 17:10)             13.1   6.38  )-----------( 239      ( 22 Mar 2022 17:39 )             38.8     03-22    129<L>  |  95<L>  |  15  ----------------------------<  102<H>  4.3   |  23  |  0.84    Ca    9.0      22 Mar 2022 17:39    TPro  6.3  /  Alb  4.2  /  TBili  0.3  /  DBili  x   /  AST  18  /  ALT  12  /  AlkPhos  70  03-22    PT/INR - ( 22 Mar 2022 17:39 )   PT: 11.0 sec;   INR: 0.93          PTT - ( 22 Mar 2022 17:39 )  PTT:32.9 sec  CARDIAC MARKERS ( 22 Mar 2022 17:39 )  x     / 0.01 ng/mL / x     / x     / x          RADIOLOGY & ADDITIONAL STUDIES:    HCT: No acute intracranial hemorrhage or transcortical infarction.    CTA H/N: Unremarkable. No intracranial arterial occlusion or   high-grade stenosis. No carotid or vertebral artery steno-occlusive disease   or dissection.    CTP:  Negative CT perfusion study.

## 2022-03-22 NOTE — ED PROVIDER NOTE - PHYSICAL EXAMINATION
PERRL, EOMI, no nystagmus. CN intact. Strength 5/5. No pronator drift. Sensation intact. Normal speech, no dysarthria.

## 2022-03-22 NOTE — ED ADULT NURSE NOTE - OBJECTIVE STATEMENT
Pt presenting with difficulty speaking 1.5 hours PTA. PT states she could not produce words. Since resolved. Denies headache, balance issues, neuro intact.   Pt taken to Ct scan and stroke code activated.

## 2022-03-22 NOTE — H&P ADULT - ASSESSMENT
87y Female with PMHx of HLD (on home statin), HIV (on home ARV) presents to  ED today. Explains at 3:30pm today, she was on the phone and had word finding difficulty that lasted 30 mins then resolved. Symptoms had resolved in the ED. Stroke code called. Denied headache, slurred speech, new onset weakness or numbness, vision loss, facial droop, unsteady gait. CTH, CTA, CT H/N unremarkable. Will admit to stroke-telemetry service for further stroke work up given risk factors.    Neuro  #CVA workup  - start aspirin 81mg and plavix 75mg daily  - start atorvastatin 80mg daily, is on 10mg at home  - q4hr stroke neuro checks and vitals  - obtain MRI Brain without contrast  - Stroke Code HCT Results: No acute intracranial hemorrhage or transcortical infarction.  - Stroke Code CTA Results: Unremarkable. No intracranial arterial occlusion or high-grade stenosis. No carotid or vertebral artery steno-occlusive disease or dissection.  - CTP:  Negative CT perfusion study.  - Stroke education    Cards  - Goal -180  - obtain TTE with bubble  - Stroke Code EKG Results: NSR, no ischemia    #HLD  - high dose statin as above in CVA,   - LDL pending    Pulm  - call provider if SPO2 < 94%    GI  #Nutrition/Fluids/Electrolytes   - replete K<4 and Mg <2  - Diet: dash  - IVF: n/a    Renal  - f/u BUN/Cr    Infectious Disease  - f/u Stroke Code CXR results    # HIV  - called patient's pharmacy, pharmacist said patient is not on ARV although she states she is, confirm with patient  - CD4 554, VL undetectable as of 2/18/22    Endocrine  - f/u TSH  - f/u A1C    DVT Prophylaxis  - lovenox sq for DVT prophylaxis   - SCDs for DVT prophylaxis     Dispo: admit to stroke     Discussed with Neurology Attending Dr. Rose

## 2022-03-22 NOTE — ED PROVIDER NOTE - PROGRESS NOTE DETAILS
[place]: Na 129, other labs grossly wnl. CTs w/ no acute pathology. no current neuro symptoms. d/w neuro, will admit for further care.

## 2022-03-22 NOTE — ED PROVIDER NOTE - OBJECTIVE STATEMENT
87F PMH HIV, HLD p/w speech difficulty. ~1.5hrs PTA pt was on phone, was attempting to give her address to someone and then states that the words wouldn't come out (unclear if unable to talk at all or just gibberish). Lasted a few min then resolved. Pt currently just feels frightened but otherwise completely asymptomatic. No hx of similar prior.   Not on AC.   Denies HA, new vision changes, focal weakness/numbness, vertigo, neck pain, rashes, tinnitus, hearing changes, URI symptoms, f/c, SOB/CP, NVD, abd pain, urinary complaints, black/bloody stool, lifestyle/dietary/medication changes.

## 2022-03-22 NOTE — H&P ADULT - NSHPPOADEEPVENOUSTHROMB_GEN_A_CORE
Problem: Respiratory  Intervention: Respiratory assessment  BRONCHOSPASM/BRONCHOCONSTRICTION     [x]         IMPROVE AERATION/BREATH SOUNDS  [x]   ADMINISTER BRONCHODILATOR THERAPY AS APPROPRIATE  [x]   ASSESS BREATH SOUNDS  []   IMPLEMENT AEROSOL/MDI PROTOCOL  [x]   PATIENT EDUCATION AS NEEDED no

## 2022-03-22 NOTE — H&P ADULT - NSHPPHYSICALEXAM_GEN_ALL_CORE
Physical exam:  General: No acute distress, awake and alert    Neurologic:  -Mental status: Awake, alert, oriented to person, place, and time. Speech is fluent with intact naming, and comprehension, no dysarthria. Recent and remote memory intact. Follows commands. Attention/concentration intact.   -Cranial nerves:   II: Visual fields are full to confrontation.  III, IV, VI: Extraocular movements are intact without nystagmus. Pupils equally round and reactive to light  V:  Facial sensation V1-V3 equal and intact   VII: Face is symmetric with normal eye closure and smile  XI: Head turning and shoulder shrug are intact.  XII: Tongue protrudes midline  Motor: Normal bulk and tone. No pronator drift. Strength bilateral upper extremity 5/5, bilateral lower extremities 5/5.  Sensation: Intact to light touch bilaterally. No neglect or extinction on double simultaneous testing.  Coordination: No dysmetria on finger-to-nose bilaterally

## 2022-03-22 NOTE — ED ADULT NURSE NOTE - NSIMPLEMENTINTERV_GEN_ALL_ED
Implemented All Universal Safety Interventions:  [place] to call system. Call bell, personal items and telephone within reach. Instruct patient to call for assistance. Room bathroom lighting operational. Non-slip footwear when patient is off stretcher. Physically safe environment: no spills, clutter or unnecessary equipment. Stretcher in lowest position, wheels locked, appropriate side rails in place.

## 2022-03-22 NOTE — ED ADULT NURSE NOTE - NSFALLRSKASSESASSIST_ED_ALL_ED
Diabetes    Diabetes    Diabetes mellitus  x 20 years, denies N/N/R  Glaucoma    HTN (hypertension)    HTN (Hypertension)    Hypercholesterolemia    Hyperlipidemia    Hyperlipidemia    Hypertension    Retinal Hemorrhage  s/p bilateral laser surgery  S/P cardiac catheterization  ~2007 in [facility] as per pt
no

## 2022-03-22 NOTE — ED ADULT TRIAGE NOTE - CHIEF COMPLAINT QUOTE
Pt BIBA from home. At 3:50pm she was on the phone with her friend when she became aphasic (self reported). Pt states she has having difficulty organizing what she was trying to say. Denies any focal weakness, numbness. Pt picked up by mobile stroke unit, had CT in ambulance. Hx of HTN, no blood thinners. Symptoms have resolved, now speaking in full clear sentences.

## 2022-03-23 LAB
A1C WITH ESTIMATED AVERAGE GLUCOSE RESULT: 5.6 % — SIGNIFICANT CHANGE UP (ref 4–5.6)
ANION GAP SERPL CALC-SCNC: 10 MMOL/L — SIGNIFICANT CHANGE UP (ref 5–17)
BUN SERPL-MCNC: 14 MG/DL — SIGNIFICANT CHANGE UP (ref 7–23)
CALCIUM SERPL-MCNC: 9 MG/DL — SIGNIFICANT CHANGE UP (ref 8.4–10.5)
CHLORIDE SERPL-SCNC: 99 MMOL/L — SIGNIFICANT CHANGE UP (ref 96–108)
CHOLEST SERPL-MCNC: 199 MG/DL — SIGNIFICANT CHANGE UP
CO2 SERPL-SCNC: 25 MMOL/L — SIGNIFICANT CHANGE UP (ref 22–31)
CREAT SERPL-MCNC: 0.86 MG/DL — SIGNIFICANT CHANGE UP (ref 0.5–1.3)
EGFR: 66 ML/MIN/1.73M2 — SIGNIFICANT CHANGE UP
ESTIMATED AVERAGE GLUCOSE: 114 MG/DL — SIGNIFICANT CHANGE UP (ref 68–114)
GLUCOSE SERPL-MCNC: 99 MG/DL — SIGNIFICANT CHANGE UP (ref 70–99)
HCT VFR BLD CALC: 39.8 % — SIGNIFICANT CHANGE UP (ref 34.5–45)
HDLC SERPL-MCNC: 92 MG/DL — SIGNIFICANT CHANGE UP
HGB BLD-MCNC: 13.2 G/DL — SIGNIFICANT CHANGE UP (ref 11.5–15.5)
LIPID PNL WITH DIRECT LDL SERPL: 96 MG/DL — SIGNIFICANT CHANGE UP
MAGNESIUM SERPL-MCNC: 2 MG/DL — SIGNIFICANT CHANGE UP (ref 1.6–2.6)
MCHC RBC-ENTMCNC: 33.2 GM/DL — SIGNIFICANT CHANGE UP (ref 32–36)
MCHC RBC-ENTMCNC: 33.4 PG — SIGNIFICANT CHANGE UP (ref 27–34)
MCV RBC AUTO: 100.8 FL — HIGH (ref 80–100)
NON HDL CHOLESTEROL: 107 MG/DL — SIGNIFICANT CHANGE UP
NRBC # BLD: 0 /100 WBCS — SIGNIFICANT CHANGE UP (ref 0–0)
PHOSPHATE SERPL-MCNC: 4 MG/DL — SIGNIFICANT CHANGE UP (ref 2.5–4.5)
PLATELET # BLD AUTO: 222 K/UL — SIGNIFICANT CHANGE UP (ref 150–400)
POTASSIUM SERPL-MCNC: 4.5 MMOL/L — SIGNIFICANT CHANGE UP (ref 3.5–5.3)
POTASSIUM SERPL-SCNC: 4.5 MMOL/L — SIGNIFICANT CHANGE UP (ref 3.5–5.3)
RBC # BLD: 3.95 M/UL — SIGNIFICANT CHANGE UP (ref 3.8–5.2)
RBC # FLD: 12.2 % — SIGNIFICANT CHANGE UP (ref 10.3–14.5)
SODIUM SERPL-SCNC: 134 MMOL/L — LOW (ref 135–145)
TRIGL SERPL-MCNC: 57 MG/DL — SIGNIFICANT CHANGE UP
WBC # BLD: 5.51 K/UL — SIGNIFICANT CHANGE UP (ref 3.8–10.5)
WBC # FLD AUTO: 5.51 K/UL — SIGNIFICANT CHANGE UP (ref 3.8–10.5)

## 2022-03-23 PROCEDURE — 99223 1ST HOSP IP/OBS HIGH 75: CPT

## 2022-03-23 PROCEDURE — 93306 TTE W/DOPPLER COMPLETE: CPT | Mod: 26

## 2022-03-23 RX ORDER — CITALOPRAM 10 MG/1
20 TABLET, FILM COATED ORAL DAILY
Refills: 0 | Status: DISCONTINUED | OUTPATIENT
Start: 2022-03-23 | End: 2022-03-25

## 2022-03-23 RX ORDER — CITALOPRAM 10 MG/1
1 TABLET, FILM COATED ORAL
Qty: 0 | Refills: 0 | DISCHARGE

## 2022-03-23 RX ORDER — DOLUTEGRAVIR SODIUM AND LAMIVUDINE 50; 300 MG/1; MG/1
1 TABLET, FILM COATED ORAL
Qty: 0 | Refills: 0 | DISCHARGE

## 2022-03-23 RX ORDER — LAMIVUDINE 150 MG
300 TABLET ORAL DAILY
Refills: 0 | Status: DISCONTINUED | OUTPATIENT
Start: 2022-03-23 | End: 2022-03-25

## 2022-03-23 RX ORDER — INFLUENZA VIRUS VACCINE 15; 15; 15; 15 UG/.5ML; UG/.5ML; UG/.5ML; UG/.5ML
0.7 SUSPENSION INTRAMUSCULAR ONCE
Refills: 0 | Status: DISCONTINUED | OUTPATIENT
Start: 2022-03-23 | End: 2022-03-25

## 2022-03-23 RX ORDER — ATORVASTATIN CALCIUM 80 MG/1
40 TABLET, FILM COATED ORAL AT BEDTIME
Refills: 0 | Status: DISCONTINUED | OUTPATIENT
Start: 2022-03-23 | End: 2022-03-25

## 2022-03-23 RX ORDER — DOLUTEGRAVIR SODIUM 25 MG/1
50 TABLET, FILM COATED ORAL DAILY
Refills: 0 | Status: DISCONTINUED | OUTPATIENT
Start: 2022-03-23 | End: 2022-03-25

## 2022-03-23 RX ADMIN — Medication 81 MILLIGRAM(S): at 14:36

## 2022-03-23 RX ADMIN — ENOXAPARIN SODIUM 40 MILLIGRAM(S): 100 INJECTION SUBCUTANEOUS at 22:16

## 2022-03-23 RX ADMIN — CLOPIDOGREL BISULFATE 75 MILLIGRAM(S): 75 TABLET, FILM COATED ORAL at 14:36

## 2022-03-23 RX ADMIN — Medication 300 MILLIGRAM(S): at 14:35

## 2022-03-23 RX ADMIN — ATORVASTATIN CALCIUM 40 MILLIGRAM(S): 80 TABLET, FILM COATED ORAL at 22:15

## 2022-03-23 RX ADMIN — CITALOPRAM 20 MILLIGRAM(S): 10 TABLET, FILM COATED ORAL at 22:15

## 2022-03-23 RX ADMIN — DOLUTEGRAVIR SODIUM 50 MILLIGRAM(S): 25 TABLET, FILM COATED ORAL at 14:35

## 2022-03-23 NOTE — PHYSICAL THERAPY INITIAL EVALUATION ADULT - PERTINENT HX OF CURRENT PROBLEM, REHAB EVAL
Pt. is an 87 y.o female presenting s/p transient episode of word finding difficulty lasting ~ 40 min, resolved spontaneously; admitted for further neuro w/u..

## 2022-03-23 NOTE — PROGRESS NOTE ADULT - SUBJECTIVE AND OBJECTIVE BOX
Neurology Stroke Progress Note    INTERVAL HPI/OVERNIGHT EVENTS:  Patient seen and examined. States she feels she is back to baseline with her speech although she has not been speaking much. Told patient that she will see speech pathology today.    MEDICATIONS  (STANDING):  aspirin enteric coated 81 milliGRAM(s) Oral daily  atorvastatin 80 milliGRAM(s) Oral at bedtime  clopidogrel Tablet 75 milliGRAM(s) Oral daily  enoxaparin Injectable 40 milliGRAM(s) SubCutaneous every 24 hours  influenza  Vaccine (HIGH DOSE) 0.7 milliLiter(s) IntraMuscular once    MEDICATIONS  (PRN):      Allergies    Paxil (Unknown)  penicillins (Unknown)  sulfa drugs (Rash)  Terramycin (Unknown)  vancomycin (Rash)    Intolerances        Vital Signs Last 24 Hrs  T(C): 36.9 (23 Mar 2022 05:39), Max: 36.9 (23 Mar 2022 01:11)  T(F): 98.4 (23 Mar 2022 05:39), Max: 98.5 (23 Mar 2022 01:11)  HR: 68 (23 Mar 2022 04:00) (56 - 68)  BP: 113/57 (23 Mar 2022 04:00) (113/57 - 168/74)  BP(mean): 82 (23 Mar 2022 04:00) (82 - 97)  RR: 18 (23 Mar 2022 04:00) (18 - 18)  SpO2: 92% (23 Mar 2022 04:00) (92% - 99%)    Physical exam:  General: No acute distress, awake and alert  Eyes: Anicteric sclerae, moist conjunctivae, see below for CNs  Neck: trachea midline, FROM, supple  Cardiovascular: Regular rate and rhythm, no murmurs, rubs, or gallops. No carotid bruits.   Pulmonary: Anterior breath sounds clear bilaterally, no crackles or wheezing. No use of accessory muscles  GI: Abdomen soft, non-distended, non-tender  Extremities: Radial and DP pulses +2, no edema    Neurologic:  -Mental status: Awake, alert, oriented to person, place, and time. Speech is fluent with intact naming, repetition, and comprehension, no dysarthria. Recent and remote memory intact. Follows commands. Attention/concentration intact. Fund of knowledge appropriate.  -Cranial nerves:   II: Visual fields are full to confrontation.  III, IV, VI: Extraocular movements are intact without nystagmus. Pupils equally round and reactive to light  V:  Facial sensation V1-V3 equal and intact   VII: Face is symmetric with normal eye closure and smile  VIII: Hearing is bilaterally intact to finger rub  IX, X: Uvula is midline and soft palate rises symmetrically  XI: Head turning and shoulder shrug are intact.  XII: Tongue protrudes midline  Motor: Normal bulk and tone. No pronator drift. Strength bilateral upper extremity 5/5, bilateral lower extremities 5/5.  Rapid alternating movements intact and symmetric  Sensation: Intact to light touch bilaterally. No neglect or extinction on double simultaneous testing.  Coordination: No dysmetria on finger-to-nose and heel-to-shin bilaterally  Reflexes: Downgoing toes bilaterally   Gait: Narrow gait and steady    LABS:                        13.1   6.38  )-----------( 239      ( 22 Mar 2022 17:39 )             38.8     03-22    129<L>  |  95<L>  |  15  ----------------------------<  102<H>  4.3   |  23  |  0.84    Ca    9.0      22 Mar 2022 17:39    TPro  6.3  /  Alb  4.2  /  TBili  0.3  /  DBili  x   /  AST  18  /  ALT  12  /  AlkPhos  70  03-22    PT/INR - ( 22 Mar 2022 17:39 )   PT: 11.0 sec;   INR: 0.93          PTT - ( 22 Mar 2022 17:39 )  PTT:32.9 sec  Urinalysis Basic - ( 22 Mar 2022 19:07 )    Color: Yellow / Appearance: Clear / SG: <=1.005 / pH: x  Gluc: x / Ketone: NEGATIVE  / Bili: Negative / Urobili: 0.2 E.U./dL   Blood: x / Protein: NEGATIVE mg/dL / Nitrite: NEGATIVE   Leuk Esterase: Small / RBC: < 5 /HPF / WBC < 5 /HPF   Sq Epi: x / Non Sq Epi: 0-5 /HPF / Bacteria: Present /HPF        RADIOLOGY & ADDITIONAL TESTS:     Neurology Stroke Progress Note    INTERVAL HPI/OVERNIGHT EVENTS:  Patient seen and examined. States she feels she is back to baseline with her speech although she has not been speaking much. Told patient that she will see speech pathology today.    MEDICATIONS  (STANDING):  aspirin enteric coated 81 milliGRAM(s) Oral daily  atorvastatin 80 milliGRAM(s) Oral at bedtime  clopidogrel Tablet 75 milliGRAM(s) Oral daily  enoxaparin Injectable 40 milliGRAM(s) SubCutaneous every 24 hours  influenza  Vaccine (HIGH DOSE) 0.7 milliLiter(s) IntraMuscular once    MEDICATIONS  (PRN):    Allergies: Paxil (Unknown), penicillins (Unknown), sulfa drugs (Rash), Terramycin (Unknown), vancomycin (Rash)    Intolerances    Vital Signs Last 24 Hrs  T(C): 36.9 (23 Mar 2022 05:39), Max: 36.9 (23 Mar 2022 01:11)  T(F): 98.4 (23 Mar 2022 05:39), Max: 98.5 (23 Mar 2022 01:11)  HR: 68 (23 Mar 2022 04:00) (56 - 68)  BP: 113/57 (23 Mar 2022 04:00) (113/57 - 168/74)  BP(mean): 82 (23 Mar 2022 04:00) (82 - 97)  RR: 18 (23 Mar 2022 04:00) (18 - 18)  SpO2: 92% (23 Mar 2022 04:00) (92% - 99%)    Physical exam:  General: No acute distress, awake and alert  Eyes: Anicteric sclerae, moist conjunctivae, see below for CNs  Neck: trachea midline, FROM, supple  Extremities: no edema    Neurologic:  -Mental status: Awake, alert, oriented to person, place, and time. Speech is fluent with intact naming, repetition, and comprehension, no dysarthria. Recent and remote memory intact. Follows commands. Attention/concentration intact.   -Cranial nerves:   II: Visual fields are full to confrontation.  III, IV, VI: Extraocular movements are intact without nystagmus. Pupils equally round and reactive to light  V:  Facial sensation V1-V3 equal and intact   VII: Face is symmetric with normal eye closure and smile  XI: Head turning intact.  XII: Tongue protrudes midline  Motor: Normal bulk and tone. No pronator drift. Strength bilateral upper extremity 5/5, bilateral lower extremities 5/5.  Sensation: Intact to light touch bilaterally. No neglect or extinction on double simultaneous testing.  Coordination: No dysmetria on finger-to-nose bilaterally    LABS:                        13.1   6.38  )-----------( 239      ( 22 Mar 2022 17:39 )             38.8     03-22    129<L>  |  95<L>  |  15  ----------------------------<  102<H>  4.3   |  23  |  0.84    Ca    9.0      22 Mar 2022 17:39    TPro  6.3  /  Alb  4.2  /  TBili  0.3  /  DBili  x   /  AST  18  /  ALT  12  /  AlkPhos  70  03-22    PT/INR - ( 22 Mar 2022 17:39 )   PT: 11.0 sec;   INR: 0.93          PTT - ( 22 Mar 2022 17:39 )  PTT:32.9 sec  Urinalysis Basic - ( 22 Mar 2022 19:07 )    Color: Yellow / Appearance: Clear / SG: <=1.005 / pH: x  Gluc: x / Ketone: NEGATIVE  / Bili: Negative / Urobili: 0.2 E.U./dL   Blood: x / Protein: NEGATIVE mg/dL / Nitrite: NEGATIVE   Leuk Esterase: Small / RBC: < 5 /HPF / WBC < 5 /HPF   Sq Epi: x / Non Sq Epi: 0-5 /HPF / Bacteria: Present /HPF    RADIOLOGY & ADDITIONAL TESTS:  HCT: No acute intracranial hemorrhage or transcortical infarction.  CTA H/N: Unremarkable. No intracranial arterial occlusion or high-grade stenosis. No carotid or vertebral artery steno-occlusive disease or dissection.  CTP:  Negative CT perfusion study.   Neurology Stroke Progress Note    INTERVAL HPI/OVERNIGHT EVENTS:  Patient seen and examined. States she feels she is back to baseline with her speech although she has not been speaking much over the last few years as her  passed away. Told patient that she will see speech pathology today.    MEDICATIONS  (STANDING):  aspirin enteric coated 81 milliGRAM(s) Oral daily  atorvastatin 80 milliGRAM(s) Oral at bedtime  clopidogrel Tablet 75 milliGRAM(s) Oral daily  enoxaparin Injectable 40 milliGRAM(s) SubCutaneous every 24 hours  influenza  Vaccine (HIGH DOSE) 0.7 milliLiter(s) IntraMuscular once    MEDICATIONS  (PRN):    Allergies: Paxil (Unknown), penicillins (Unknown), sulfa drugs (Rash), Terramycin (Unknown), vancomycin (Rash)    Intolerances    Vital Signs Last 24 Hrs  T(C): 36.9 (23 Mar 2022 05:39), Max: 36.9 (23 Mar 2022 01:11)  T(F): 98.4 (23 Mar 2022 05:39), Max: 98.5 (23 Mar 2022 01:11)  HR: 68 (23 Mar 2022 04:00) (56 - 68)  BP: 113/57 (23 Mar 2022 04:00) (113/57 - 168/74)  BP(mean): 82 (23 Mar 2022 04:00) (82 - 97)  RR: 18 (23 Mar 2022 04:00) (18 - 18)  SpO2: 92% (23 Mar 2022 04:00) (92% - 99%)    Physical exam:  General: No acute distress, awake and alert  Eyes: Anicteric sclerae, moist conjunctivae, see below for CNs  Neck: trachea midline, FROM, supple  Extremities: no edema    Neurologic:  -Mental status: Awake, alert, oriented to person, place, and time. Speech is fluent with intact naming, repetition, and comprehension, no dysarthria. Recent and remote memory intact. Follows commands. Attention/concentration intact.   -Cranial nerves:   II: Visual fields are full to confrontation.  III, IV, VI: Extraocular movements are intact without nystagmus. Pupils equally round and reactive to light  V:  Facial sensation V1-V3 equal and intact   VII: Face is symmetric with normal eye closure and smile  XI: Head turning intact.  XII: Tongue protrudes midline  Motor: Normal bulk and tone. No pronator drift. Strength bilateral upper extremity 5/5, bilateral lower extremities 5/5.  Sensation: Intact to light touch bilaterally. No neglect or extinction on double simultaneous testing.  Coordination: No dysmetria on finger-to-nose bilaterally    LABS:                        13.1   6.38  )-----------( 239      ( 22 Mar 2022 17:39 )             38.8     03-22    129<L>  |  95<L>  |  15  ----------------------------<  102<H>  4.3   |  23  |  0.84    Ca    9.0      22 Mar 2022 17:39    TPro  6.3  /  Alb  4.2  /  TBili  0.3  /  DBili  x   /  AST  18  /  ALT  12  /  AlkPhos  70  03-22    PT/INR - ( 22 Mar 2022 17:39 )   PT: 11.0 sec;   INR: 0.93          PTT - ( 22 Mar 2022 17:39 )  PTT:32.9 sec  Urinalysis Basic - ( 22 Mar 2022 19:07 )    Color: Yellow / Appearance: Clear / SG: <=1.005 / pH: x  Gluc: x / Ketone: NEGATIVE  / Bili: Negative / Urobili: 0.2 E.U./dL   Blood: x / Protein: NEGATIVE mg/dL / Nitrite: NEGATIVE   Leuk Esterase: Small / RBC: < 5 /HPF / WBC < 5 /HPF   Sq Epi: x / Non Sq Epi: 0-5 /HPF / Bacteria: Present /HPF    RADIOLOGY & ADDITIONAL TESTS:  HCT: No acute intracranial hemorrhage or transcortical infarction.  CTA H/N: Unremarkable. No intracranial arterial occlusion or high-grade stenosis. No carotid or vertebral artery steno-occlusive disease or dissection.  CTP:  Negative CT perfusion study.   Neurology Stroke Progress Note    INTERVAL HPI/OVERNIGHT EVENTS:  Patient seen and examined. States she feels she is back to baseline with her speech although she has not been speaking much over the last few years as her  passed away. Told patient that she will see speech pathology today.    MEDICATIONS  (STANDING):  aspirin enteric coated 81 milliGRAM(s) Oral daily  atorvastatin 80 milliGRAM(s) Oral at bedtime  clopidogrel Tablet 75 milliGRAM(s) Oral daily  enoxaparin Injectable 40 milliGRAM(s) SubCutaneous every 24 hours  influenza  Vaccine (HIGH DOSE) 0.7 milliLiter(s) IntraMuscular once    MEDICATIONS  (PRN):    Allergies: Paxil (Unknown), penicillins (Unknown), sulfa drugs (Rash), Terramycin (Unknown), vancomycin (Rash)    Intolerances    Vital Signs Last 24 Hrs  T(C): 36.9 (23 Mar 2022 05:39), Max: 36.9 (23 Mar 2022 01:11)  T(F): 98.4 (23 Mar 2022 05:39), Max: 98.5 (23 Mar 2022 01:11)  HR: 68 (23 Mar 2022 04:00) (56 - 68)  BP: 113/57 (23 Mar 2022 04:00) (113/57 - 168/74)  BP(mean): 82 (23 Mar 2022 04:00) (82 - 97)  RR: 18 (23 Mar 2022 04:00) (18 - 18)  SpO2: 92% (23 Mar 2022 04:00) (92% - 99%)    Physical exam:  General: No acute distress, awake and alert  Eyes: Anicteric sclerae, moist conjunctivae, see below for CNs  Neck: trachea midline, FROM, supple  Extremities: no edema    Neurologic:  -Mental status: Awake, alert, oriented to person, place, and time. Speech is fluent with intact naming, repetition, and comprehension, no dysarthria. Recent and remote memory intact. Follows commands. Attention/concentration intact.   -Cranial nerves:   II: Visual fields are full to confrontation.  III, IV, VI: Extraocular movements are intact without nystagmus. Pupils equally round and reactive to light  V:  Facial sensation V1-V3 equal and intact   VII: Face is symmetric with normal eye closure and smile  XI: Head turning intact.  XII: Tongue protrudes midline  Motor: Normal bulk and tone. No pronator drift. Strength bilateral upper extremity 5/5, bilateral lower extremities 5/5.  Sensation: Intact to light touch bilaterally. No neglect or extinction on double simultaneous testing.  Coordination: No dysmetria on finger-to-nose bilaterally    LABS:                        13.1   6.38  )-----------( 239      ( 22 Mar 2022 17:39 )             38.8     03-22    129<L>  |  95<L>  |  15  ----------------------------<  102<H>  4.3   |  23  |  0.84    Ca    9.0      22 Mar 2022 17:39    TPro  6.3  /  Alb  4.2  /  TBili  0.3  /  DBili  x   /  AST  18  /  ALT  12  /  AlkPhos  70  03-22    PT/INR - ( 22 Mar 2022 17:39 )   PT: 11.0 sec;   INR: 0.93          PTT - ( 22 Mar 2022 17:39 )  PTT:32.9 sec  Urinalysis Basic - ( 22 Mar 2022 19:07 )    Color: Yellow / Appearance: Clear / SG: <=1.005 / pH: x  Gluc: x / Ketone: NEGATIVE  / Bili: Negative / Urobili: 0.2 E.U./dL   Blood: x / Protein: NEGATIVE mg/dL / Nitrite: NEGATIVE   Leuk Esterase: Small / RBC: < 5 /HPF / WBC < 5 /HPF   Sq Epi: x / Non Sq Epi: 0-5 /HPF / Bacteria: Present /HPF    RADIOLOGY & ADDITIONAL TESTS:  HCT: No acute intracranial hemorrhage or transcortical infarction.  CTA H/N: Unremarkable. No intracranial arterial occlusion or high-grade stenosis. No carotid or vertebral artery steno-occlusive disease or dissection.  CTP:  Negative CT perfusion study  MRI brain: no acute infarct

## 2022-03-23 NOTE — OCCUPATIONAL THERAPY INITIAL EVALUATION ADULT - MANUAL MUSCLE TESTING RESULTS, REHAB EVAL
BUE 5/5 throughout. CN Testing:  II: Visual fields are full to confrontation.  III, IV, VI: Extraocular movements are intact without nystagmus. Pupils equally round and reactive to light  V:  Facial sensation V1-V3 equal and intact   VII: Face is symmetric with normal eye closure and smile  XI: Head turning and shoulder shrug are intact.  XII: Tongue protrudes midline.

## 2022-03-23 NOTE — PROGRESS NOTE ADULT - ASSESSMENT
87y Female with PMHx of HLD (on home statin), HIV (on home ARV) presents to  ED 3/22 for word finding difficulty that lasted 30 mins then resolved. Stroke code called. CTH, CTA, CT H/N unremarkable. Concern for TIA. Admitted to stroke-telemetry service for further stroke work up given risk factors.    Neuro  #CVA workup  - start aspirin 81mg and plavix 75mg daily  - start atorvastatin 80mg daily, is on 10mg at home  - q4hr stroke neuro checks and vitals  - obtain MRI Brain without contrast  - Stroke Code HCT Results: No acute intracranial hemorrhage or transcortical infarction.  - Stroke Code CTA Results: Unremarkable. No intracranial arterial occlusion or high-grade stenosis. No carotid or vertebral artery steno-occlusive disease or dissection.  - CTP:  Negative CT perfusion study.  - Stroke education    Cards  - Goal -180  - obtain TTE with bubble  - Stroke Code EKG Results: NSR, no ischemia    #HLD  - high dose statin as above in CVA,   - LDL pending    Pulm  - call provider if SPO2 < 94%    GI  #Nutrition/Fluids/Electrolytes   - replete K<4 and Mg <2  - Diet: dash  - IVF: n/a    Renal  - f/u BUN/Cr    Infectious Disease  - f/u Stroke Code CXR results    # HIV  - called patient's pharmacy, pharmacist said patient is not on ARV although she states she is, confirm with patient  - CD4 554, VL undetectable as of 2/18/22    Endocrine  - f/u TSH  - f/u A1C    DVT Prophylaxis  - lovenox sq for DVT prophylaxis   - SCDs for DVT prophylaxis     IDR Goals: Goals reviewed at interdisciplinary rounds with case management, social work, physical therapy, occupational therapy, and speech language pathology.   Please see specific therapy  notes for in depth goals.  Dispo: **********(Acute rehab - can tolerate 3 hours of therapy)     Discussed daily hospital plans and goals with patient and family at bedside. (Called and updated family.)     Discussed with Neurology Attending Dr. Rose                 87y Female with PMHx of HLD (on home atorvastatin 10mg), HIV (on home Dovato) presents to  ED on 3/22 for word finding difficulty that lasted 30 mins then resolved. Stroke code called. CTH, CTP, CT H/N unremarkable. Concern for TIA. Admitted to stroke-telemetry service for further stroke work up given risk factors.    Neuro  #CVA workup  - start aspirin 81mg and plavix 75mg daily  - start atorvastatin 80mg daily, is on 10mg at home  - q4hr stroke neuro checks and vitals  - obtain MRI Brain without contrast  - Stroke Code HCT Results: No acute intracranial hemorrhage or transcortical infarction.  - Stroke Code CTA Results: Unremarkable. No intracranial arterial occlusion or high-grade stenosis. No carotid or vertebral artery steno-occlusive disease or dissection.  - CTP:  Negative CT perfusion study.  - Stroke education    Cards  - Goal -180  - obtain TTE with bubble  - Stroke Code EKG Results: NSR, no ischemia    #HLD  - high dose statin as above in CVA,   - LDL pending    Pulm  - call provider if SPO2 < 94%    GI  #Nutrition/Fluids/Electrolytes   - replete K<4 and Mg <2  - Diet: dash  - IVF: n/a    Renal  - f/u BUN/Cr    Infectious Disease  # HIV  - confirmed meds with Dr. Cesar   - c/w Dovato  - CD4 554, VL undetectable as of 2/18/22    Endocrine  - f/u TSH  - f/u A1C    DVT Prophylaxis  - lovenox sq for DVT prophylaxis   - SCDs for DVT prophylaxis     IDR Goals: Goals reviewed at interdisciplinary rounds with case management, social work, physical therapy, occupational therapy, and speech language pathology.   Please see specific therapy  notes for in depth goals.  Dispo: **********(Acute rehab - can tolerate 3 hours of therapy)     Discussed daily hospital plans and goals with patient and family at bedside. (Called and updated family.)     Discussed with Neurology Attending Dr. Rose                 87y Female with PMHx of HLD (on home atorvastatin 10mg), HIV (on home Dovato) presents to  ED on 3/22 for word finding difficulty that lasted 30 mins then resolved. Stroke code called. CTH, CTP, CT H/N unremarkable. Concern for TIA. Admitted to stroke-telemetry service for further stroke work up given risk factors.    Neuro  #CVA workup  - start aspirin 81mg and plavix 75mg daily  - start atorvastatin 80mg daily, is on 10mg at home  - q4hr stroke neuro checks and vitals  - obtain MRI Brain without contrast  - Stroke Code HCT Results: No acute intracranial hemorrhage or transcortical infarction.  - Stroke Code CTA Results: Unremarkable. No intracranial arterial occlusion or high-grade stenosis. No carotid or vertebral artery steno-occlusive disease or dissection.  - CTP:  Negative CT perfusion study.  - Stroke education    Cards  - Goal -180  - obtain TTE with bubble  - Stroke Code EKG Results: NSR, no ischemia    #HLD  - high dose statin as above in CVA,   - LDL pending    Pulm  - call provider if SPO2 < 94%    GI  #Nutrition/Fluids/Electrolytes   - replete K<4 and Mg <2  - Diet: dash  - IVF: n/a    Renal  - f/u BUN/Cr    Infectious Disease  # HIV  - confirmed meds with Dr. Cesar (patient's ID physician)  - c/w Dovato  - CD4 554, VL undetectable as of 2/18/22    Endocrine  - f/u TSH  - f/u A1C    DVT Prophylaxis  - lovenox sq for DVT prophylaxis   - SCDs for DVT prophylaxis     IDR Goals: Goals reviewed at interdisciplinary rounds with case management, social work, physical therapy, occupational therapy, and speech language pathology.   Please see specific therapy  notes for in depth goals.  Dispo: **********(Acute rehab - can tolerate 3 hours of therapy)     Discussed daily hospital plans and goals with patient and family at bedside. (Called and updated family.)     Discussed with Neurology Attending Dr. Rose                 87y Female with PMHx of HLD (on home atorvastatin 10mg), HIV (on home Dovato) presents to  ED on 3/22 for word finding difficulty that lasted 30 mins then resolved. Stroke code called. CTH, CTP, CT H/N unremarkable. Concern for TIA. Admitted to stroke-telemetry service for further stroke work up given risk factors.    Neuro  #CVA workup  - start aspirin 81mg and plavix 75mg daily  - start atorvastatin 80mg daily, is on 10mg at home  - q4hr stroke neuro checks and vitals  - obtain MRI Brain without contrast  - Stroke Code HCT Results: No acute intracranial hemorrhage or transcortical infarction.  - Stroke Code CTA Results: Unremarkable. No intracranial arterial occlusion or high-grade stenosis. No carotid or vertebral artery steno-occlusive disease or dissection.  - CTP:  Negative CT perfusion study.  - Stroke education    Cards  - Goal -180  - obtain TTE with bubble  - Stroke Code EKG Results: NSR, no ischemia    #HLD  - high dose statin as above in CVA,   - LDL pending    Pulm  - call provider if SPO2 < 94%    GI  #Nutrition/Fluids/Electrolytes   - replete K<4 and Mg <2  - Diet: dash  - IVF: n/a    Renal  - f/u BUN/Cr    Infectious Disease  # HIV  - confirmed meds with Dr. Cesar (patient's ID physician)  - c/w Dovato  - CD4 554, VL undetectable as of 2/18/22    Endocrine  - f/u TSH  - A1C: 5.6    DVT Prophylaxis  - lovenox sq for DVT prophylaxis   - SCDs for DVT prophylaxis     IDR Goals: Goals reviewed at interdisciplinary rounds with case management, social work, physical therapy, occupational therapy, and speech language pathology.   Please see specific therapy  notes for in depth goals.  Dispo: **********(Acute rehab - can tolerate 3 hours of therapy)     Discussed daily hospital plans and goals with patient and family at bedside. (Called and updated family.)     Discussed with Neurology Attending Dr. Rose                 87y Female with PMHx of HLD (on home atorvastatin 10mg), HIV (on home Dovato) presents to  ED on 3/22 for word finding difficulty that lasted 30 mins then resolved. Stroke code called. CTH, CTP, CTA H/N unremarkable. Concern for TIA. Admitted to stroke-telemetry service for further stroke work up given risk factors.    Neuro  #CVA workup  - start aspirin 81mg and plavix 75mg daily  - start atorvastatin 40mg daily, is on 10mg at home  - q4hr stroke neuro checks and vitals  - MRI brain:   - Stroke Code HCT Results: No acute intracranial hemorrhage or transcortical infarction.  - Stroke Code CTA Results: Unremarkable. No intracranial arterial occlusion or high-grade stenosis. No carotid or vertebral artery steno-occlusive disease or dissection.  - CTP:  Negative CT perfusion study.  - Stroke education    Cards  - Goal -180  - TTE: normal bilateral ventricular function, normal atria, EF 65-70%  - f/u JEFF, npo after midnight  - ILR today  - Stroke Code EKG Results: NSR, no ischemia    #HLD  - high dose statin as above in CVA,   - LDL: 96    Pulm  - call provider if SPO2 < 94%    GI  #Nutrition/Fluids/Electrolytes   - replete K<4 and Mg <2  - Diet: dash  - IVF: n/a    Renal  - Bun/Cr: 14/0.86    Infectious Disease  # HIV  - confirmed meds with Dr. Cesar (patient's ID physician)  - c/w Dovato  - CD4 554, VL undetectable as of 2/18/22    Endocrine  - f/u TSH  - A1C: 5.6    DVT Prophylaxis  - lovenox sq for DVT prophylaxis   - SCDs for DVT prophylaxis     IDR Goals: Goals reviewed at interdisciplinary rounds with case management, social work, physical therapy, occupational therapy, and speech language pathology.   Please see specific therapy  notes for in depth goals.  Dispo: **********(Acute rehab - can tolerate 3 hours of therapy)     Discussed daily hospital plans and goals with patient and family at bedside. (Called and updated family.)     Discussed with Neurology Attending Dr. Rose                 87y Female with PMHx of HLD (on home atorvastatin 10mg), HIV (on home Dovato) presents to  ED on 3/22 for word finding difficulty that lasted 30 mins then resolved. Stroke code called. CTH, CTP, CTA H/N unremarkable. Concern for TIA. Admitted to stroke-telemetry service for further stroke work up given risk factors.    Neuro  #CVA workup  - start aspirin 81mg and plavix 75mg daily  - start atorvastatin 40mg daily, is on 10mg at home  - q4hr stroke neuro checks and vitals  - MRI brain: no acute infarct  - Stroke Code HCT Results: No acute intracranial hemorrhage or transcortical infarction.  - Stroke Code CTA Results: Unremarkable. No intracranial arterial occlusion or high-grade stenosis. No carotid or vertebral artery steno-occlusive disease or dissection.  - CTP:  Negative CT perfusion study.  - Stroke education    Cards  - Goal -180  - TTE: normal bilateral ventricular function, normal atria, EF 65-70%  - f/u JEFF, npo after midnight  - ILR today  - Stroke Code EKG Results: NSR, no ischemia    #HLD  - high dose statin as above in CVA,   - LDL: 96    Pulm  - call provider if SPO2 < 94%    GI  #Nutrition/Fluids/Electrolytes   - replete K<4 and Mg <2  - Diet: dash  - IVF: n/a    Renal  - Bun/Cr: 14/0.86    Infectious Disease  # HIV  - confirmed meds with Dr. Cesar (patient's ID physician)  - c/w Dovato  - CD4 554, VL undetectable as of 2/18/22    Endocrine  - f/u TSH  - A1C: 5.6    DVT Prophylaxis  - lovenox sq for DVT prophylaxis   - SCDs for DVT prophylaxis     IDR Goals: Goals reviewed at interdisciplinary rounds with case management, social work, physical therapy, occupational therapy, and speech language pathology.   Please see specific therapy  notes for in depth goals.  Dispo: home     Discussed daily hospital plans and goals with patient at bedside.     Discussed with Neurology Attending Dr. Rose                 87y Female with PMHx of HLD (on home atorvastatin 10mg), HIV (on home Dovato) presents to  ED on 3/22 for word finding difficulty that lasted 30 mins then resolved. Stroke code called. CTH, CTP, CTA H/N unremarkable. Concern for TIA. Admitted to stroke-telemetry service for further stroke work up given risk factors.    Neuro  #CVA workup  - start aspirin 81mg and plavix 75mg daily  - start atorvastatin 40mg daily, is on 10mg at home  - q4hr stroke neuro checks and vitals  - MRI brain: no acute infarct  - Stroke Code HCT Results: No acute intracranial hemorrhage or transcortical infarction.  - Stroke Code CTA Results: Unremarkable. No intracranial arterial occlusion or high-grade stenosis. No carotid or vertebral artery steno-occlusive disease or dissection.  - CTP:  Negative CT perfusion study.  - Stroke education    Cards  - Goal -180  - TTE: normal bilateral ventricular function, normal atria, EF 65-70%  - f/u JEFF, npo after midnight  - ILR 3/24  - Stroke Code EKG Results: NSR, no ischemia    #HLD  - high dose statin as above in CVA,   - LDL: 96    Pulm  - call provider if SPO2 < 94%    GI  #Nutrition/Fluids/Electrolytes   - replete K<4 and Mg <2  - Diet: dash  - IVF: n/a    Renal  - Bun/Cr: 14/0.86    Infectious Disease  # HIV  - confirmed meds with Dr. Cesar (patient's ID physician)  - c/w Dovato  - CD4 554, VL undetectable as of 2/18/22    Endocrine  - f/u TSH  - A1C: 5.6    DVT Prophylaxis  - lovenox sq for DVT prophylaxis   - SCDs for DVT prophylaxis     IDR Goals: Goals reviewed at interdisciplinary rounds with case management, social work, physical therapy, occupational therapy, and speech language pathology.   Please see specific therapy  notes for in depth goals.  Dispo: home     Discussed daily hospital plans and goals with patient at bedside.     Discussed with Neurology Attending Dr. Rose

## 2022-03-23 NOTE — OCCUPATIONAL THERAPY INITIAL EVALUATION ADULT - PERTINENT HX OF CURRENT PROBLEM, REHAB EVAL
87 y o Female with PMHx of HLD (on home statin), HIV (on home ARV) presents to  ED today. Explains at 3:30pm today, she was on the phone and had word finding difficulty that lasted 30 mins then resolved. Symptoms had resolved in the ED. Stroke code called. Denied headache, slurred speech, new onset weakness or numbness, vision loss, facial droop, unsteady gait. CTH, CTA, CT H/N unremarkable.

## 2022-03-23 NOTE — PHYSICAL THERAPY INITIAL EVALUATION ADULT - MODALITIES TREATMENT COMMENTS
Facial strength, symmery, strength - intact, visual acuity/oculomotor intact bilat w/o nystagmus, hearing intact bilat to finger rub, tongue protrudes at midline, shoulder shrug 5/5 bilat; Speech fluent, no word finding difficulties; heel ant toe walk intact- mod. imp[aired tandem walk- baseline as per pt.

## 2022-03-23 NOTE — PATIENT PROFILE ADULT - FALL HARM RISK - HARM RISK INTERVENTIONS

## 2022-03-23 NOTE — CONSULT NOTE ADULT - SUBJECTIVE AND OBJECTIVE BOX
Patient is a 87y old  Female who presents with a chief complaint of stroke code (23 Mar 2022 08:09)       HPI:   **STROKE HPI***    HPI: 87y Female with PMHx of HLD (on home statin), HIV (on home ARV) presents to  ED today. Explains at 3:30pm today, she was on the phone and had word finding difficulty that lasted 30 mins then resolved. Symptoms had resolved in the ED. Stroke code called. Denied headache, slurred speech, new onset weakness or numbness, vision loss, facial droop, unsteady gait.     PAST MEDICAL & SURGICAL HISTORY:  HIV (human immunodeficiency virus infection)    HLD (hyperlipidemia)    No significant past surgical history    T(C): 36.5 (03-22-22 @ 16:58), Max: 36.5 (03-22-22 @ 16:58)  HR: 60 (03-22-22 @ 18:03) (60 - 63)  BP: 168/74 (03-22-22 @ 18:03) (143/70 - 168/74)  RR: 18 (03-22-22 @ 18:03) (18 - 18)  SpO2: 97% (03-22-22 @ 18:03) (97% - 97%)    Vital Signs Last 24 Hrs  T(C): 36.5 (22 Mar 2022 16:58), Max: 36.5 (22 Mar 2022 16:58)  T(F): 97.7 (22 Mar 2022 16:58), Max: 97.7 (22 Mar 2022 16:58)  HR: 60 (22 Mar 2022 18:03) (60 - 63)  BP: 168/74 (22 Mar 2022 18:03) (143/70 - 168/74)  BP(mean): --  RR: 18 (22 Mar 2022 18:03) (18 - 18)  SpO2: 97% (22 Mar 2022 18:03) (97% - 97%)    NIHSS: 0           (22 Mar 2022 18:28)      PAST MEDICAL & SURGICAL HISTORY:  HIV (human immunodeficiency virus infection)    HLD (hyperlipidemia)    No significant past surgical history        MEDICATIONS  (STANDING):  aspirin enteric coated 81 milliGRAM(s) Oral daily  atorvastatin 80 milliGRAM(s) Oral at bedtime  clopidogrel Tablet 75 milliGRAM(s) Oral daily  enoxaparin Injectable 40 milliGRAM(s) SubCutaneous every 24 hours  influenza  Vaccine (HIGH DOSE) 0.7 milliLiter(s) IntraMuscular once    MEDICATIONS  (PRN):      FAMILY HISTORY:      CBC Full  -  ( 22 Mar 2022 17:39 )  WBC Count : 6.38 K/uL  RBC Count : 3.85 M/uL  Hemoglobin : 13.1 g/dL  Hematocrit : 38.8 %  Platelet Count - Automated : 239 K/uL  Mean Cell Volume : 100.8 fl  Mean Cell Hemoglobin : 34.0 pg  Mean Cell Hemoglobin Concentration : 33.8 gm/dL  Auto Neutrophil # : 3.98 K/uL  Auto Lymphocyte # : 1.54 K/uL  Auto Monocyte # : 0.71 K/uL  Auto Eosinophil # : 0.08 K/uL  Auto Basophil # : 0.04 K/uL  Auto Neutrophil % : 62.4 %  Auto Lymphocyte % : 24.1 %  Auto Monocyte % : 11.1 %  Auto Eosinophil % : 1.3 %  Auto Basophil % : 0.6 %      03-22    129<L>  |  95<L>  |  15  ----------------------------<  102<H>  4.3   |  23  |  0.84    Ca    9.0      22 Mar 2022 17:39    TPro  6.3  /  Alb  4.2  /  TBili  0.3  /  DBili  x   /  AST  18  /  ALT  12  /  AlkPhos  70  03-22      Urinalysis Basic - ( 22 Mar 2022 19:07 )    Color: Yellow / Appearance: Clear / SG: <=1.005 / pH: x  Gluc: x / Ketone: NEGATIVE  / Bili: Negative / Urobili: 0.2 E.U./dL   Blood: x / Protein: NEGATIVE mg/dL / Nitrite: NEGATIVE   Leuk Esterase: Small / RBC: < 5 /HPF / WBC < 5 /HPF   Sq Epi: x / Non Sq Epi: 0-5 /HPF / Bacteria: Present /HPF          Radiology:    < from: CT Brain Stroke Protocol (03.22.22 @ 17:32) >  ACC: [id number] EXAM:  CT BRAIN STROKE PROTOCOL                          PROCEDURE DATE:  03/22/2022          INTERPRETATION:  PROCEDURE: CT head without intravenous contrast    INDICATION: Transient aphasia. Stroke Code.    TECHNIQUE: Multiple axialimages were obtained at 5 mm intervals from the   skull base to the vertex. Sagittal and coronal reformatted images were   obtained from the axial data set. The images were reviewed in brain and   bone windows. RAPID AI was used for preliminary interpretation in   hemorrhage detection.    COMPARISON: None.    FINDINGS:  VENTRICLES AND SULCI: Age appropriate parenchymal volume loss, mild and   with frontal predilection. No hydrocephalus.  INTRA-AXIAL: No intracranial mass, acute hemorrhage, or midline shift is   present. No acute transcortical infarction. Patchy periventricular and   subcortical white matter hypodensities consistent with microvascular   ischemic change.  EXTRA-AXIAL: No extra-axial fluid collection is present.  VISUALIZED SINUSES: No air-fluid levels are identified.  VISUALIZED MASTOIDS: Clear.  CALVARIUM: 1.2 cm left frontal calvarium inner table benign osteoma. No   fracture.  MISCELLANEOUS: Bilateral absent ocular lenses consistent with prior   cataract surgery.    IMPRESSION:    No acute intracranial hemorrhage or transcortical infarction.        < from: CT Perfusion w/ Maps w/ IV Cont (03.22.22 @ 17:33) >  ACC: [id number] EXAM:  CT PERFUSION W MAPS IC                          PROCEDURE DATE:  03/22/2022          INTERPRETATION:  PROCEDURE: CT Perfusion with intravenous contrast.    INDICATION: Transient aphasia. Stroke Code.    TECHNIQUE: Following theintravenous administration of 40 ml of   Isovue-370, serial axial images were obtained through the brain. The CT   perfusion data set was post processed per [facility]   generating color maps of CBF, CBV, MTT, and Tmax.    COMPARISON: None.    FINDINGS: There is no reported perfusion abnormality based on parameters   of Tmax greater than 6 seconds or CBF less than 30%. Color maps appear   fairly symmetric without territorial deficit.    IMPRESSION: Negative CT perfusion study.        < from: CT Angio Head w/ IV Cont (03.22.22 @ 17:34) >  ACC: [id number] EXAM:  CT ANGIO BRAIN (W)AW IC                        ACC: [id number] EXAM:  CT ANGIO NECK (W)AW IC                          PROCEDURE DATE:  03/22/2022          INTERPRETATION:  PROCEDURES:  CTA brain with intravenous contrast.  CTA neck with intravenous contrast.    INDICATION: Transient aphasia. Stroke code    TECHNIQUE: Multiple axial thin section were obtained from the aortic arch   through the neck and intracranial compartment up to the calvarial vertex.   Imaging is done after the IV bolus of 80cc Isovue-370. MIP series are   provided.    COMPARISON: None    FINDINGS:    INTRACRANIAL:  The internal carotid arteries are patent at the skull base and   intracranial compartment without occlusion or high grade stenosis. The   anterior and middle cerebral arteries are patent at their 1st and 2nd   order segments, and appear symmetric caliber. The posterior circulation   shows no high grade stenosis or occlusion. The intracranial vertebral   arteries, the basilar artery and both posterior cerebral arteries are   patent. There is no site of aneurysm within the resolution limitations of   CTA.    EXTRACRANIAL:    The aortic arch is normal size and shows patency, with standard 3 vessel   configuration. No significant great vessel stenosis.    Both common carotid arteries are patent to the bifurcations. Both   internal carotid arteries are widely patent, normal in caliber to the   skull base without point stenosis or dissection.    Vertebral arteries are patent at their origins and throughout their   course in the neck. No evidence of vertebral artery dissection.    Disc degeneration of the cervical spine, quite advanced at C5-6 and C6-7   and there is anterolisthesis of C4-5 and the presence of severe facet   arthritis.      IMPRESSION:    Intracranial CTA: Unremarkable. No intracranial arterial occlusion or   high-grade stenosis.    Extracranial CTA: No carotid or vertebral artery steno-occlusive disease   or dissection.                  Vital Signs Last 24 Hrs  T(C): 36.9 (23 Mar 2022 05:39), Max: 36.9 (23 Mar 2022 01:11)  T(F): 98.4 (23 Mar 2022 05:39), Max: 98.5 (23 Mar 2022 01:11)  HR: 68 (23 Mar 2022 04:00) (56 - 68)  BP: 113/57 (23 Mar 2022 04:00) (113/57 - 168/74)  BP(mean): 82 (23 Mar 2022 04:00) (82 - 97)  RR: 18 (23 Mar 2022 04:00) (18 - 18)  SpO2: 92% (23 Mar 2022 04:00) (92% - 99%)        REVIEW OF SYSTEMS:    CONSTITUTIONAL: No fever, weight loss, or fatigue  EYES: No eye pain, visual disturbances, or discharge  ENMT:  No difficulty hearing, tinnitus, vertigo; No sinus or throat pain  NECK: No pain or stiffness  BREASTS: No pain, masses, or nipple discharge  RESPIRATORY: No cough, wheezing, chills or hemoptysis; No shortness of breath  CARDIOVASCULAR: No chest pain, palpitations, dizziness, or leg swelling  GASTROINTESTINAL: No abdominal or epigastric pain. No nausea, vomiting, or hematemesis; No diarrhea or constipation. No melena or hematochezia.  GENITOURINARY: No dysuria, frequency, hematuria, or incontinence  NEUROLOGICAL: per HPI  SKIN: No itching, burning, rashes, or lesions   LYMPH NODES: No enlarged glands  ENDOCRINE: No heat or cold intolerance; No hair loss  MUSCULOSKELETAL: No joint pain or swelling; No muscle, back, or extremity pain  PSYCHIATRIC: No depression, anxiety, mood swings, or difficulty sleeping  HEME/LYMPH: No easy bruising, or bleeding gums  ALLERGY AND IMMUNOLOGIC: No hives or eczema  VASCULAR: no swelling, erythema,           Physical Exam:   88 yo woman lying in semi Suarez's position, awake, alert, conversant, no acute complaints    Head: normocephalic, atraumatic    Eyes: PERRLA, EOMI, no nystagmus, sclera anicteric    ENT: nasal discharge, uvula midline, no oropharyngeal erythema/exudate    Neck: supple, negative JVD, negative carotid bruits, no thyromegaly    Chest: CTA bilaterally, neg wheeze/ rhonchi/ rales/ crackles/ egophany    Cardiovascular: regular rate and rhythm, neg murmurs/rubs/gallops    Abdomen: soft, non distended, non tender to palpation in all 4 quadrants, negative rebound/guarding, normal bowel sounds    Extremities: WWP, neg cyanosis/clubbing/edema, negative calf tenderness to palpation, negative Anaid's sign    Neurologic Exam:    Alert and oriented to person, place, date/year, speech fluent w/o dysarthria, follows commands, recent and remote memory intact, repetition intact, comprehension intact,  attention/concentration intact, fund of knowledge appropriate    Cranial Nerves:     II:                         pupils equal, round and reactive to light, visual fields intact   III/ IV/VI:              extraocular movements intact, neg nystagmus, neg ptosis  V:                        facial sensation intact, V1-3 normal  VII:                      face symmetric, no droop, normal eye closure and smile  VIII:                     hearing intact to finger rub bilaterally  IX and X:             no hoarseness, gag intact, palate/ uvula rise symmetrically  XI:                       SCM/ trapezius strength intact bilateral  XII:                      no tongue deviation    Motor Exam:    Right UE:             : 5/5                             wrist extensors/ flexors: 5/5                             biceps:   5/5                             triceps:  5/5                             deltoid:  5/5                             pronator drift: neg    Left UE:               :  5/5                             wrist extensors/ flexors:  5/5                             biceps:    5/5                             triceps:   5/5                             deltoid:  5/5                             pronator drift: neg      Right LE:             dorsiflexors:  5/5                             plantar flexors:  5/5                             quadriceps:  5/5                             hamstrings:  5/5                             hip flexors:  5/5    Left LE:               dorsiflexors:  5/5                            plantar flexors:  5/5                            quadriceps:  5/5                            hamstrings:  5/5                            hip flexors:  5/5               Sensation:           intact to light touch x 4 extremities                            No neglect or extinction on double simultaneous testing                       DTR:                  biceps/brachioradialis: equal                                                       patella/ankle: equal                                                       neg clonus                           neg Babinski                        Coordination:                              Finger to Nose:  neg dysmetria bilaterally                        Gait:  not tested              PM&R Impression:    1) TIA, admitted for stroke workup  2) no focal neuro deficits    Recommendations/ Plan :    1) Physical / Occupational therapy focusing on therapeutic exercises, bed mobility/transfer out of bed evaluation, progressive ambulation with assistive devices prn.    2) Anticipated Disposition Plan/Recs:    pending functional progress                  
  Patient is a 87y old  Female who presents with a chief complaint of stroke code (23 Mar 2022 08:40)      HPI:  As per Neurology H&P,  "87y Female with PMHx of HLD (on home statin), HIV (on home ARV) presents to  ED today. Explains at 3:30pm today, she was on the phone and had word finding difficulty that lasted 30 mins then resolved. Symptoms had resolved in the ED. Stroke code called. Denied headache, slurred speech, new onset weakness or numbness, vision loss, facial droop, unsteady gait."    Subjective:  This morning patient reports she feels improved - speech back at baseline. Denies any palpitations/chest pain/weakness/numbness/tingling or any other concerns. Says she has never had a similar episode like this in the past. Patient with adherence to medications at home - no recent changes or missed doses. No sick contacts. Patient says she drinks a glass of wine occasionally at night - no hx of tremors/withdrawals. No hx of intubations or seizures. Denies tobacco use.         Review of Systems: 12 point review of systems otherwise negative    MEDICATIONS  (STANDING):  aspirin enteric coated 81 milliGRAM(s) Oral daily  atorvastatin 40 milliGRAM(s) Oral at bedtime  clopidogrel Tablet 75 milliGRAM(s) Oral daily  dolutegravir 50 milliGRAM(s) Oral daily  enoxaparin Injectable 40 milliGRAM(s) SubCutaneous every 24 hours  influenza  Vaccine (HIGH DOSE) 0.7 milliLiter(s) IntraMuscular once  lamiVUDine 300 milliGRAM(s) Oral daily    MEDICATIONS  (PRN):      Allergies    Paxil (Unknown)  penicillins (Unknown)  sulfa drugs (Rash)  Terramycin (Unknown)  vancomycin (Rash)    Intolerances          Vital Signs Last 24 Hrs  T(C): 36.4 (23 Mar 2022 09:42), Max: 36.9 (23 Mar 2022 01:11)  T(F): 97.6 (23 Mar 2022 09:42), Max: 98.5 (23 Mar 2022 01:11)  HR: 70 (23 Mar 2022 09:47) (56 - 70)  BP: 159/69 (23 Mar 2022 09:47) (113/57 - 168/74)  BP(mean): 99 (23 Mar 2022 09:47) (82 - 99)  RR: 16 (23 Mar 2022 08:28) (16 - 18)  SpO2: 97% (23 Mar 2022 09:47) (92% - 99%)  CAPILLARY BLOOD GLUCOSE  197 (22 Mar 2022 17:10)      POCT Blood Glucose.: 109 mg/dL (22 Mar 2022 16:58)        Physical Exam:    Daily Height in cm: 157.48 (22 Mar 2022 16:58)    Daily   General:  Well appearing, NAD, not cachetic  HEENT:  Nonicteric, PERRLA  CV:  RRR, no murmur, no JVD  Lungs:  CTA B/L, no wheezes, rales, rhonchi  Abdomen:  Soft, non-tender, no distended, positive BS, no hepatosplenomegaly  Extremities:  2+ pulses, no c/c, no edema  Skin:  Warm and dry, no rashes  :  No esparza  Neuro:  AAOx3, non-focal, CN II-XII grossly intact, sensation and strength intact b/l   No Restraints    LABS:                        13.2   5.51  )-----------( 222      ( 23 Mar 2022 09:49 )             39.8     03-23    134<L>  |  99  |  14  ----------------------------<  99  4.5   |  25  |  0.86    Ca    9.0      23 Mar 2022 09:49  Phos  4.0     03-23  Mg     2.0     03-23    TPro  6.3  /  Alb  4.2  /  TBili  0.3  /  DBili  x   /  AST  18  /  ALT  12  /  AlkPhos  70  03-22    PT/INR - ( 22 Mar 2022 17:39 )   PT: 11.0 sec;   INR: 0.93          PTT - ( 22 Mar 2022 17:39 )  PTT:32.9 sec  Urinalysis Basic - ( 22 Mar 2022 19:07 )    Color: Yellow / Appearance: Clear / SG: <=1.005 / pH: x  Gluc: x / Ketone: NEGATIVE  / Bili: Negative / Urobili: 0.2 E.U./dL   Blood: x / Protein: NEGATIVE mg/dL / Nitrite: NEGATIVE   Leuk Esterase: Small / RBC: < 5 /HPF / WBC < 5 /HPF   Sq Epi: x / Non Sq Epi: 0-5 /HPF / Bacteria: Present /HPF          RADIOLOGY & ADDITIONAL TESTS:  reviewed

## 2022-03-23 NOTE — CONSULT NOTE ADULT - ASSESSMENT
per Neurology    87 y o Female with PMHx of HLD (on home statin), HIV (on home ARV) presents to  ED today. Explains at 3:30pm today, she was on the phone and had word finding difficulty that lasted 30 mins then resolved. Symptoms had resolved in the ED. Stroke code called. Denied headache, slurred speech, new onset weakness or numbness, vision loss, facial droop, unsteady gait. CTH, CTA, CT H/N unremarkable. Will admit to stroke-telemetry service for further stroke work up given risk factors.    Neuro  #CVA workup  - start aspirin 81mg and plavix 75mg daily  - start atorvastatin 80mg daily, is on 10mg at home  - q4hr stroke neuro checks and vitals  - obtain MRI Brain without contrast  - Stroke Code HCT Results: No acute intracranial hemorrhage or transcortical infarction.  - Stroke Code CTA Results: Unremarkable. No intracranial arterial occlusion or high-grade stenosis. No carotid or vertebral artery steno-occlusive disease or dissection.  - CTP:  Negative CT perfusion study.  - Stroke education    Cards  - Goal -180  - obtain TTE with bubble  - Stroke Code EKG Results: NSR, no ischemia    #HLD  - high dose statin as above in CVA,   - LDL pending    Pulm  - call provider if SPO2 < 94%    GI  #Nutrition/Fluids/Electrolytes   - replete K<4 and Mg <2  - Diet: dash  - IVF: n/a    Renal  - f/u BUN/Cr    Infectious Disease  - f/u Stroke Code CXR results    # HIV  - called patient's pharmacy, pharmacist said patient is not on ARV although she states she is, confirm with patient  - CD4 554, VL undetectable as of 2/18/22    Endocrine  - f/u TSH  - f/u A1C    DVT Prophylaxis  - lovenox sq for DVT prophylaxis   - SCDs for DVT prophylaxis     Dispo: admit to stroke
87y Female with PMHx of HLD (on home statin), HIV (on home ARV) presents to  ED today. Explains at 3:30pm today, she was on the phone and had word finding difficulty that lasted 30 mins then resolved in setting of CTH, CTA, CT H/N unremarkable, pending further work up and medicine following for comanagement.    #CVA work up  #HIV  #HLD  #macrocytosis    Recommendations:  -f/u JEFF  -plan for ILR today per primary team  -recommend TSH, vit b12, folate  -a1c 5.6; lipid panel wnl  -continue HIV medications   -continue statin, ASA, plavix per primary team

## 2022-03-23 NOTE — OCCUPATIONAL THERAPY INITIAL EVALUATION ADULT - VISUAL ASSESSMENT: VISUAL FIELD CUTS
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 On exam patient noted to have fullness to supraglottic tissues as well as necrotic glottic tissue with significantly narrowed airway. Patient now s/p awake tracheostomy, direct laryngoscopy and biopsy 2/27.     POD0: Immediately postop, patient noted to be lethargic and somnolent, not following commands. STAT labs showing some C02 retention but otherwise wnl. Trop neg. STAT CXR and EKG performed. NCHCT without intracranial abnormality. After CT performed, patient mental status returned to baseline. Now alert and oriented, following commands and interactive. Pain well controlled. No trach issues, remains on vent.   2/27: fluctuating mental status, EEG monitoring initiated, cardiac work-up negative, on ventilator overnight  2/28: JOSÉ MIGUEL. AFVSS. Mental status at baseline. EEG negative and discontinued per neurology. Weaned to TC. Failed bedside SLP evaluation with e/o gross aspiration. NGT placed. Repeat trop neg x3. EKG neg for ST elevation x3  3/1: NGT sitting in mid-esophagus on CXR. Advanced to 60cm and repeat CXR pending. Otherwise JOSÉ MIGUEL. AFVSS. Breathing comfortably on TC. Pain well controlled.   3/2: JOSÉ MIGUEL overnight. tolerating feeds. no issues with breathing. Transferred to SDU. Clinda d/c'ed.   3/3: Trach changed to7.0 proximal XLT.    3/4: JOSÉ MIGUEL. AFVSS. Tolerating tube feeds at goal. No n/v. Ambulating. Breathing comfortably. Still with high suction requirement.   3/5: JOSÉ MIGUEL overnight. Accidental disldging of NGT overnight. No plans to replace tube for now. Continues to have high suction requirement. secretions clear.   3/6: tolerating PO diet. secretion clear, suction requirement remains high. Lives at home with his brother. Able to suction and change inner cannula without assistance this am with directions. Trach changed to 7.0 proximal XLT shiley cuffless.  3/7: JOSÉ MIGUEL. AFVSS. Still with copious secretions and at least q4h suction requirements. Improved phonation with finger occlusion. No new complaints. Advanced to [facility] soft/nectar thick liquid diet per SLP.   3/8: well overnight. ambulating. secretion and suction requirements reducing. tolerating PO diet    Vital Signs Last 24 Hrs  T(C): 36.2 (08 Mar 2018 05:20), Max: 37.6 (07 Mar 2018 17:38)  T(F): 97.2 (08 Mar 2018 05:20), Max: 99.7 (07 Mar 2018 17:38)  HR: 70 (08 Mar 2018 05:12) (66 - 94)  BP: 110/65 (08 Mar 2018 05:12) (104/61 - 122/80)  BP(mean): 80 (08 Mar 2018 05:12) (77 - 97)  RR: 18 (08 Mar 2018 05:12) (14 - 18)  SpO2: 96% (08 Mar 2018 05:12) (95% - 98%)    PHYSICAL EXAM:  NAD, A&Ox3, NC/AT, face grossly symmetric   7.0 proximal XLT cuffless shiley trach secured in place with velcro soft Glenn collar  nonlabored respirations on TC  moderate thick, clear secretions around trach and suctioned, suction catheter passes easily without resistance  Inner cannula removed and cleaned   no peristomal oozing or surrounding skin breakdown        Assessment/Plan:    65M current smoker w/ 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

## 2022-03-23 NOTE — PHYSICAL THERAPY INITIAL EVALUATION ADULT - ELBOW EXTENSION MMT, REHAB EVAL
Telephone Encounter by Milena Cornell RN at 02/28/17 04:06 PM     Author:  Milena Cornell RN Service:  (none) Author Type:  Registered Nurse     Filed:  02/28/17 04:06 PM Encounter Date:  2/28/2017 Status:  Signed     :  Milena Cornell RN (Registered Nurse)       From: Lyndsey Barreto  To: Luz Patel MD  Sent: 2/28/2017 12:34 PM CST  Subject: Lab Tests  Test Related Questions    I just had to let you know no one contacted me with the results of the   xray I had on my knee Thursday! I had to call in today for the results.   When I left a message on this forum Friday I was told someone would be in   touch. I never got a call. When I called today, thankfully I was told my   knee is OK.     I have new insurance that allows me to go to any Doctor.  Please don't   make it  in my best interest to change.    Lyndsey Barreto       Revision History        Date/Time User Provider Type Action    > 02/28/17 04:06 PM Milena Cornell, RN Registered Nurse Sign    Attribution information within the note text is not available.            
Telephone Encounter by Rosa Campos RN at 02/28/17 04:46 PM     Author:  Rosa Campos RN Service:  (none) Author Type:  Registered Nurse     Filed:  02/28/17 04:46 PM Encounter Date:  2/28/2017 Status:  Signed     :  Rosa Campos RN (Registered Nurse)            Apologized to pt for delay[DC1.1M]      Revision History        User Key Date/Time User Provider Type Action    > DC1.1 02/28/17 04:46 PM Rosa Campos RN Registered Nurse Sign    M - Manual            
(5) normal, left/(5) normal, right

## 2022-03-23 NOTE — PROGRESS NOTE ADULT - SUBJECTIVE AND OBJECTIVE BOX
86 yo F with history of HLD , HIV  who was admitted for work up of stroke. Patient states that she had an episode of difficulty finding word, it resolved after 30 min. In hospital work up was negative for CVA, EPS was called for ILR implant.  The utility of ILR and implant were discussed with patient, at this time she wants to get more information, talk to her daughter   before making decision.  Please inform  EPS if patient agrees for procedure.

## 2022-03-23 NOTE — PHYSICAL THERAPY INITIAL EVALUATION ADULT - ADDITIONAL COMMENTS
Pt. denies current use of AD as of past three weeks, prior to that intermittently used SC for L ankle sprain, = h/o several falls post passing of her spouse in 8/21, currently in OP PT for balance training.

## 2022-03-24 LAB
ANION GAP SERPL CALC-SCNC: 7 MMOL/L — SIGNIFICANT CHANGE UP (ref 5–17)
BUN SERPL-MCNC: 15 MG/DL — SIGNIFICANT CHANGE UP (ref 7–23)
CALCIUM SERPL-MCNC: 9.4 MG/DL — SIGNIFICANT CHANGE UP (ref 8.4–10.5)
CHLORIDE SERPL-SCNC: 99 MMOL/L — SIGNIFICANT CHANGE UP (ref 96–108)
CO2 SERPL-SCNC: 27 MMOL/L — SIGNIFICANT CHANGE UP (ref 22–31)
CREAT SERPL-MCNC: 0.85 MG/DL — SIGNIFICANT CHANGE UP (ref 0.5–1.3)
EGFR: 67 ML/MIN/1.73M2 — SIGNIFICANT CHANGE UP
FOLATE SERPL-MCNC: 18 NG/ML — SIGNIFICANT CHANGE UP
GLUCOSE SERPL-MCNC: 99 MG/DL — SIGNIFICANT CHANGE UP (ref 70–99)
HCT VFR BLD CALC: 43 % — SIGNIFICANT CHANGE UP (ref 34.5–45)
HGB BLD-MCNC: 14.2 G/DL — SIGNIFICANT CHANGE UP (ref 11.5–15.5)
MAGNESIUM SERPL-MCNC: 2.1 MG/DL — SIGNIFICANT CHANGE UP (ref 1.6–2.6)
MCHC RBC-ENTMCNC: 33 GM/DL — SIGNIFICANT CHANGE UP (ref 32–36)
MCHC RBC-ENTMCNC: 33.9 PG — SIGNIFICANT CHANGE UP (ref 27–34)
MCV RBC AUTO: 102.6 FL — HIGH (ref 80–100)
NRBC # BLD: 0 /100 WBCS — SIGNIFICANT CHANGE UP (ref 0–0)
PHOSPHATE SERPL-MCNC: 4.2 MG/DL — SIGNIFICANT CHANGE UP (ref 2.5–4.5)
PLATELET # BLD AUTO: 238 K/UL — SIGNIFICANT CHANGE UP (ref 150–400)
POTASSIUM SERPL-MCNC: 5.2 MMOL/L — SIGNIFICANT CHANGE UP (ref 3.5–5.3)
POTASSIUM SERPL-SCNC: 5.2 MMOL/L — SIGNIFICANT CHANGE UP (ref 3.5–5.3)
RBC # BLD: 4.19 M/UL — SIGNIFICANT CHANGE UP (ref 3.8–5.2)
RBC # FLD: 12.5 % — SIGNIFICANT CHANGE UP (ref 10.3–14.5)
SODIUM SERPL-SCNC: 133 MMOL/L — LOW (ref 135–145)
T4 FREE SERPL-MCNC: 0.99 NG/DL — SIGNIFICANT CHANGE UP (ref 0.93–1.7)
TSH SERPL-MCNC: 4.16 UIU/ML — SIGNIFICANT CHANGE UP (ref 0.27–4.2)
VIT B12 SERPL-MCNC: 1697 PG/ML — HIGH (ref 232–1245)
WBC # BLD: 4.91 K/UL — SIGNIFICANT CHANGE UP (ref 3.8–10.5)
WBC # FLD AUTO: 4.91 K/UL — SIGNIFICANT CHANGE UP (ref 3.8–10.5)

## 2022-03-24 PROCEDURE — 99233 SBSQ HOSP IP/OBS HIGH 50: CPT

## 2022-03-24 PROCEDURE — 99232 SBSQ HOSP IP/OBS MODERATE 35: CPT

## 2022-03-24 PROCEDURE — 33285 INSJ SUBQ CAR RHYTHM MNTR: CPT

## 2022-03-24 PROCEDURE — 93312 ECHO TRANSESOPHAGEAL: CPT | Mod: 26

## 2022-03-24 RX ADMIN — Medication 300 MILLIGRAM(S): at 12:11

## 2022-03-24 RX ADMIN — CLOPIDOGREL BISULFATE 75 MILLIGRAM(S): 75 TABLET, FILM COATED ORAL at 12:10

## 2022-03-24 RX ADMIN — ENOXAPARIN SODIUM 40 MILLIGRAM(S): 100 INJECTION SUBCUTANEOUS at 22:22

## 2022-03-24 RX ADMIN — ATORVASTATIN CALCIUM 40 MILLIGRAM(S): 80 TABLET, FILM COATED ORAL at 22:22

## 2022-03-24 RX ADMIN — CITALOPRAM 20 MILLIGRAM(S): 10 TABLET, FILM COATED ORAL at 12:10

## 2022-03-24 RX ADMIN — Medication 81 MILLIGRAM(S): at 12:10

## 2022-03-24 RX ADMIN — DOLUTEGRAVIR SODIUM 50 MILLIGRAM(S): 25 TABLET, FILM COATED ORAL at 12:10

## 2022-03-24 NOTE — PROGRESS NOTE ADULT - ASSESSMENT
87y Female with PMHx of HLD (on home atorvastatin 10mg), HIV (on home Dovato) presents to  ED on 3/22 for word finding difficulty that lasted 30 mins then resolved. Stroke code called. CTH, CTP, CTA H/N unremarkable. Concern for TIA. Admitted to stroke-telemetry service for further stroke work up given risk factors.    Neuro  #CVA workup  - continue aspirin 81mg and plavix 75mg daily  - continue atorvastatin 40mg daily, is on 10mg at home  - q4hr stroke neuro checks and vitals  - MRI brain: no acute infarct  - Stroke Code HCT Results: No acute intracranial hemorrhage or transcortical infarction.  - Stroke Code CTA Results: Unremarkable. No intracranial arterial occlusion or high-grade stenosis. No carotid or vertebral artery steno-occlusive disease or dissection.  - CTP:  Negative CT perfusion study.  - Stroke education    Cards  - Goal -180  - TTE: normal bilateral ventricular function, normal atria, EF 65-70%  - f/u JEFF, npo after midnight  - ILR 3/24  - Stroke Code EKG Results: NSR, no ischemia    #HLD  - high dose statin as above in CVA,   - LDL: 96    Pulm  - call provider if SPO2 < 94%    GI  #Nutrition/Fluids/Electrolytes   - replete K<4 and Mg <2  - Diet: dash  - IVF: n/a    Renal  - Bun/Cr: 14/0.86    Infectious Disease  # HIV  - confirmed meds with Dr. Cesar (patient's ID physician)  - c/w Dovato  - CD4 554, VL undetectable as of 2/18/22    Endocrine  - f/u TSH  - A1C: 5.6    DVT Prophylaxis  - lovenox sq for DVT prophylaxis   - SCDs for DVT prophylaxis     IDR Goals: Goals reviewed at interdisciplinary rounds with case management, social work, physical therapy, occupational therapy, and speech language pathology.   Please see specific therapy  notes for in depth goals.  Dispo: home     Discussed daily hospital plans and goals with patient at bedside.     Discussed with Neurology Attending Dr. Rose   87y Female with PMHx of HLD (on home atorvastatin 10mg), HIV (on home Dovato) presents to  ED on 3/22 for word finding difficulty that lasted 30 mins then resolved. Stroke code called. CTH, CTP, CTA H/N unremarkable. Concern for TIA. Admitted to stroke-telemetry service for further stroke work up given risk factors.    Neuro  #CVA workup  - continue aspirin 81mg and plavix 75mg daily  - continue atorvastatin 40mg daily, is on 10mg at home  - q4hr stroke neuro checks and vitals  - MRI brain: no acute infarct  - Stroke Code HCT Results: No acute intracranial hemorrhage or transcortical infarction.  - Stroke Code CTA Results: Unremarkable. No intracranial arterial occlusion or high-grade stenosis. No carotid or vertebral artery steno-occlusive disease or dissection.  - CTP:  Negative CT perfusion study.  - Stroke education    Cards  - Goal -180  - TTE: normal bilateral ventricular function, normal atria, EF 65-70%  - JEFF: negative  - ILR placed 3/24  - Stroke Code EKG Results: NSR, no ischemia    #HLD  - high dose statin as above in CVA,   - LDL: 96    Pulm  - call provider if SPO2 < 94%    GI  #Nutrition/Fluids/Electrolytes   - replete K<4 and Mg <2  - Diet: dash  - IVF: n/a    Renal  - Bun/Cr: 14/0.86    Infectious Disease  # HIV  - confirmed meds with Dr. Cesar (patient's ID physician)  - c/w Dovato  - CD4 554, VL undetectable as of 2/18/22    Endocrine  - f/u TSH  - A1C: 5.6    DVT Prophylaxis  - lovenox sq for DVT prophylaxis   - SCDs for DVT prophylaxis     IDR Goals: Goals reviewed at interdisciplinary rounds with case management, social work, physical therapy, occupational therapy, and speech language pathology.   Please see specific therapy  notes for in depth goals.    Dispo: home with continued outpatient PT on 3/25     Discussed daily hospital plans and goals with patient at bedside.     Discussed with Neurology Attending Dr. Rose

## 2022-03-24 NOTE — DISCHARGE NOTE PROVIDER - HOSPITAL COURSE
Hospital course:  87y Female with PMH     During this hospital course, patient had a (ischemic/hemorrhagic) stroke located in (left/right.....) as seen on (MRI/CT).   The stroke etiology is likely secondary to:  []atrial fibrillation  []small vessel disease from atherosclerotic risk factors  []other:  []etiology workup still in progress    Patient had the following workup done in house:  CT Head:   MR Head Non Contrast:  CT Angio Head:  CT Angio Neck:  []echo  []labs  []other    Physical exam at discharge:    New medications on discharge:  Labs to be followed up:  Imaging to be done as outpatient:  Further outpatient workup:   Hospital course:  87y Female with PMHx of HLD, HIV presents with word finding difficulty x 30 minutes before self resolving. CT imaging unremarkable. MRI brain with no acute infarct. TTE/JEFF with no significant findings. ILR placed 3/24. Patient likely with a TIA event. DAPT and statin started for secondary stroke prevention. Patient medically ready for discharge.     During this hospital course, patient had no ischemic stroke on imaging.     Patient had the following workup done in house:  CT Head: No acute intracranial hemorrhage or transcortical infarction.  MR Head Non Contrast: Negative for recent infarction.  CT Angio Head and Neck: Unremarkable. No intracranial arterial occlusion or   high-grade stenosis. No carotid or vertebral artery steno-occlusive disease   or dissection.  TTE/JEFF:   1. Normal left ventricular size and systolic function.   2. Normal right ventricular size and systolic function.   3. Normal atria.   4. Injection of agitated saline via a peripheral vein reveals no   evidence of a right-to-left shunt.   5. Mild-to-moderate aortic regurgitation. The jet is eccentric and   anteriorly directed.   6. No other significant valvular disease.   7. No evidence of pulmonary hypertension.   8. Trivial pericardial effusion without echocardiographic evidence of   cardiac tamponade physiology.   9. No prior echo is available for comparison.    CONCLUSIONS:     1. Normal left ventricular size and systolic function.   2. Normal right ventricular size and systolic function.   3. No LA/ROXY thrombus seen.   4. No evidence of an intracardiac shunt.   5. Aortic sclerosis without significant stenosis.   6. Mild-to-moderate aortic regurgitation.   7. Trivial pericardial effusion.    []labs: LDL 96, A1c 5.6  []other    Physical exam at discharge: ****  NIHSS at discharge: *****    New medications on discharge:  Labs to be followed up:  Imaging to be done as outpatient:  Further outpatient workup:   Hospital course:  87y Female with PMHx of HLD, HIV (well controlled on therapy) presents with word finding difficulty x 30 minutes before self resolving. No clear associated focal neurologic deficit otherwise. CT imaging unremarkable. MRI brain with no acute infarct. TTE/JEFF with no significant findings. ILR placed 3/24. Patient likely with a TIA event. DAPT and statin started for secondary stroke prevention. Patient medically ready for discharge.     During this hospital course, patient had no ischemic stroke on imaging.     Patient had the following workup done in house:  CT Head: No acute intracranial hemorrhage or transcortical infarction.  MR Head Non Contrast: Negative for recent infarction.  CT Angio Head and Neck: Unremarkable. No intracranial arterial occlusion or   high-grade stenosis. No carotid or vertebral artery steno-occlusive disease   or dissection.  TTE/JEFF:   1. Normal left ventricular size and systolic function.   2. Normal right ventricular size and systolic function.   3. Normal atria.   4. Injection of agitated saline via a peripheral vein reveals no   evidence of a right-to-left shunt.   5. Mild-to-moderate aortic regurgitation. The jet is eccentric and   anteriorly directed.   6. No other significant valvular disease.   7. No evidence of pulmonary hypertension.   8. Trivial pericardial effusion without echocardiographic evidence of   cardiac tamponade physiology.   9. No prior echo is available for comparison.    CONCLUSIONS:     1. Normal left ventricular size and systolic function.   2. Normal right ventricular size and systolic function.   3. No LA/ROXY thrombus seen.   4. No evidence of an intracardiac shunt.   5. Aortic sclerosis without significant stenosis.   6. Mild-to-moderate aortic regurgitation.   7. Trivial pericardial effusion.    []labs: LDL 96, A1c 5.6  []other    Physical exam at discharge:   Neurologic:  -Mental status: Awake, alert, oriented to person, place, and time. Speech is fluent with intact naming, repetition, and comprehension, no dysarthria. Recent and remote memory intact. Follows commands. Attention/concentration intact. Fund of knowledge appropriate.  -Cranial nerves:   II: Visual fields are full to confrontation.  III, IV, VI: Extraocular movements are intact without nystagmus. Pupils equally round and reactive to light  V:  Facial sensation V1-V3 equal and intact   VII: Face is symmetric with normal eye closure and smile  VIII: Hearing is grossly intact  IX, X: Uvula is midline and soft palate rises symmetrically  XI: Head turning and shoulder shrug are intact.  XII: Tongue protrudes midline  Motor: Normal bulk and tone. No pronator drift. Strength bilateral upper extremity 5/5, bilateral lower extremities 5/5.  Sensation: Intact to light touch bilaterally. No neglect or extinction on double simultaneous testing.  Coordination: No dysmetria on finger-to-nose bilaterally  Gait: Narrow gait and steady    NIHSS at discharge: 0    New medications on discharge: aspirin 81mg, plavix 75mg, atorvastatin 40mg  Labs to be followed up: none  Imaging to be done as outpatient: none  Further outpatient workup: none   Hospital course:  87y Female with PMHx of HLD, HIV (well controlled on therapy) presents with word finding difficulty x 30 minutes before self resolving. No clear associated focal neurologic deficit otherwise. CT imaging unremarkable. MRI brain with no acute infarct. TTE/JEFF with no significant findings. ILR placed 3/24. Patient likely with a TIA event. DAPT and statin started for secondary stroke prevention. Patient medically ready for discharge.     During this hospital course, patient had no ischemic stroke on imaging.     Patient had the following workup done in house:  CT Head: No acute intracranial hemorrhage or transcortical infarction.  MR Head Non Contrast: Negative for recent infarction.  CT Angio Head and Neck: Unremarkable. No intracranial arterial occlusion or   high-grade stenosis. No carotid or vertebral artery steno-occlusive disease   or dissection.  TTE/JEFF:   1. Normal left ventricular size and systolic function.   2. Normal right ventricular size and systolic function.   3. Normal atria.   4. Injection of agitated saline via a peripheral vein reveals no   evidence of a right-to-left shunt.   5. Mild-to-moderate aortic regurgitation. The jet is eccentric and   anteriorly directed.   6. No other significant valvular disease.   7. No evidence of pulmonary hypertension.   8. Trivial pericardial effusion without echocardiographic evidence of   cardiac tamponade physiology.   9. No prior echo is available for comparison.    CONCLUSIONS:     1. Normal left ventricular size and systolic function.   2. Normal right ventricular size and systolic function.   3. No LA/ROXY thrombus seen.   4. No evidence of an intracardiac shunt.   5. Aortic sclerosis without significant stenosis.   6. Mild-to-moderate aortic regurgitation.   7. Trivial pericardial effusion.    []labs: LDL 96, A1c 5.6  []other    Physical exam at discharge:   Neurologic:  -Mental status: Awake, alert, oriented to person, place, and time. Speech is fluent with intact naming, repetition, and comprehension, no dysarthria. Recent and remote memory intact. Follows commands. Attention/concentration intact. Fund of knowledge appropriate.  -Cranial nerves:   II: Visual fields are full to confrontation.  III, IV, VI: Extraocular movements are intact without nystagmus. Pupils equally round and reactive to light  V:  Facial sensation V1-V3 equal and intact   VII: Face is symmetric with normal eye closure and smile  VIII: Hearing is grossly intact  IX, X: Uvula is midline and soft palate rises symmetrically  XI: Head turning and shoulder shrug are intact.  XII: Tongue protrudes midline  Motor: Normal bulk and tone. No pronator drift. Strength bilateral upper extremity 5/5, bilateral lower extremities 5/5.  Sensation: Intact to light touch bilaterally. No neglect or extinction on double simultaneous testing.  Coordination: No dysmetria on finger-to-nose bilaterally  Gait: Narrow gait and steady    NIHSS at discharge: 0    New medications on discharge: aspirin 81mg, plavix 75mg, atorvastatin 40mg  Labs to be followed up: BMP within 1 week to monitor sodium (Na 129 - 134 - 133 - 131)  Imaging to be done as outpatient: none  Further outpatient workup: none

## 2022-03-24 NOTE — DISCHARGE NOTE PROVIDER - CARE PROVIDER_API CALL
Windy Rose)  Neurology  [address] [address]  Phone: [phone]  Fax: [phone]  Follow Up Time:    Windy Rose)  Neurology  [address] [address]  Phone: [phone]  Fax: [phone]  Scheduled Appointment: 04/08/2022 09:40 AM

## 2022-03-24 NOTE — PROGRESS NOTE ADULT - SUBJECTIVE AND OBJECTIVE BOX
Neurology Stroke Progress Note    INTERVAL HPI/OVERNIGHT EVENTS:  No acute overnight events. Patient seen and examined with attending at bedside. Sleeping comfortably in bed. States she slept "very well" overnight. Understands that the plan for today is JEFF and ILR placement. All questions answered.     MEDICATIONS  (STANDING):  aspirin enteric coated 81 milliGRAM(s) Oral daily  atorvastatin 40 milliGRAM(s) Oral at bedtime  citalopram 20 milliGRAM(s) Oral daily  clopidogrel Tablet 75 milliGRAM(s) Oral daily  dolutegravir 50 milliGRAM(s) Oral daily  enoxaparin Injectable 40 milliGRAM(s) SubCutaneous every 24 hours  influenza  Vaccine (HIGH DOSE) 0.7 milliLiter(s) IntraMuscular once  lamiVUDine 300 milliGRAM(s) Oral daily    MEDICATIONS  (PRN):      Allergies    Paxil (Unknown)  penicillins (Unknown)  sulfa drugs (Rash)  Terramycin (Unknown)  vancomycin (Rash)    Intolerances        Vital Signs Last 24 Hrs  T(C): 36.6 (24 Mar 2022 05:17), Max: 36.7 (23 Mar 2022 14:00)  T(F): 97.8 (24 Mar 2022 05:17), Max: 98 (23 Mar 2022 14:00)  HR: 64 (24 Mar 2022 08:11) (56 - 70)  BP: 134/64 (24 Mar 2022 08:11) (134/64 - 159/69)  BP(mean): 91 (24 Mar 2022 08:11) (90 - 101)  RR: 16 (24 Mar 2022 08:11) (16 - 18)  SpO2: 94% (24 Mar 2022 08:11) (94% - 97%)    Physical exam:  General: No acute distress, awake and alert  Eyes: Anicteric sclerae, moist conjunctivae, see below for CNs  Neck: trachea midline  Cardiovascular: Regular rate and rhythm, no murmurs, rubs, or gallops.   Pulmonary: Anterior breath sounds clear bilaterally, no crackles or wheezing. No use of accessory muscles  GI: Abdomen soft, non-distended, non-tender  Extremities: Radial and DP pulses +2, no edema    Neurologic:  -Mental status: Awake, alert, oriented to person, place, and time. Speech is fluent with intact naming, repetition, and comprehension, no dysarthria. Recent and remote memory intact. Follows commands. Attention/concentration intact. Fund of knowledge appropriate.  -Cranial nerves:   II: Visual fields are full to confrontation.  III, IV, VI: Extraocular movements are intact without nystagmus. Pupils equally round and reactive to light  V:  Facial sensation V1-V3 equal and intact   VII: Face is symmetric with normal eye closure and smile  VIII: Hearing is grossly intact  IX, X: Uvula is midline and soft palate rises symmetrically  XI: Head turning and shoulder shrug are intact.  XII: Tongue protrudes midline  Motor: Normal bulk and tone. No pronator drift. Strength bilateral upper extremity 5/5, bilateral lower extremities 5/5.  Sensation: Intact to light touch bilaterally. No neglect or extinction on double simultaneous testing.  Coordination: No dysmetria on finger-to-nose bilaterally  Gait: Narrow gait and steady    LABS:                        13.2   5.51  )-----------( 222      ( 23 Mar 2022 09:49 )             39.8     03-23    134<L>  |  99  |  14  ----------------------------<  99  4.5   |  25  |  0.86    Ca    9.0      23 Mar 2022 09:49  Phos  4.0     03-23  Mg     2.0     03-23    TPro  6.3  /  Alb  4.2  /  TBili  0.3  /  DBili  x   /  AST  18  /  ALT  12  /  AlkPhos  70  03-22    PT/INR - ( 22 Mar 2022 17:39 )   PT: 11.0 sec;   INR: 0.93          PTT - ( 22 Mar 2022 17:39 )  PTT:32.9 sec  Urinalysis Basic - ( 22 Mar 2022 19:07 )    Color: Yellow / Appearance: Clear / SG: <=1.005 / pH: x  Gluc: x / Ketone: NEGATIVE  / Bili: Negative / Urobili: 0.2 E.U./dL   Blood: x / Protein: NEGATIVE mg/dL / Nitrite: NEGATIVE   Leuk Esterase: Small / RBC: < 5 /HPF / WBC < 5 /HPF   Sq Epi: x / Non Sq Epi: 0-5 /HPF / Bacteria: Present /HPF    RADIOLOGY & ADDITIONAL TESTS:     Neurology Stroke Progress Note    INTERVAL HPI/OVERNIGHT EVENTS:  No acute overnight events. Patient seen and examined with attending at bedside. Sleeping comfortably in bed. States she slept "very well" overnight. Understands that the plan for today is JEFF and ILR placement. All questions answered.     MEDICATIONS  (STANDING):  aspirin enteric coated 81 milliGRAM(s) Oral daily  atorvastatin 40 milliGRAM(s) Oral at bedtime  citalopram 20 milliGRAM(s) Oral daily  clopidogrel Tablet 75 milliGRAM(s) Oral daily  dolutegravir 50 milliGRAM(s) Oral daily  enoxaparin Injectable 40 milliGRAM(s) SubCutaneous every 24 hours  influenza  Vaccine (HIGH DOSE) 0.7 milliLiter(s) IntraMuscular once  lamiVUDine 300 milliGRAM(s) Oral daily    MEDICATIONS  (PRN):      Allergies    Paxil (Unknown)  penicillins (Unknown)  sulfa drugs (Rash)  Terramycin (Unknown)  vancomycin (Rash)    Intolerances        Vital Signs Last 24 Hrs  T(C): 36.6 (24 Mar 2022 05:17), Max: 36.7 (23 Mar 2022 14:00)  T(F): 97.8 (24 Mar 2022 05:17), Max: 98 (23 Mar 2022 14:00)  HR: 64 (24 Mar 2022 08:11) (56 - 70)  BP: 134/64 (24 Mar 2022 08:11) (134/64 - 159/69)  BP(mean): 91 (24 Mar 2022 08:11) (90 - 101)  RR: 16 (24 Mar 2022 08:11) (16 - 18)  SpO2: 94% (24 Mar 2022 08:11) (94% - 97%)    Physical exam:  General: No acute distress, awake and alert  Eyes: Anicteric sclerae, moist conjunctivae, see below for CNs  Neck: trachea midline  Cardiovascular: Regular rate and rhythm, no murmurs, rubs, or gallops.   Pulmonary: Anterior breath sounds clear bilaterally, no crackles or wheezing. No use of accessory muscles  GI: Abdomen soft, non-distended, non-tender  Extremities: Radial and DP pulses +2, no edema    Neurologic:  -Mental status: Awake, alert, oriented to person, place, and time. Speech is fluent with intact naming, repetition, and comprehension, no dysarthria. Recent and remote memory intact. Follows commands. Attention/concentration intact. Fund of knowledge appropriate.  -Cranial nerves:   II: Visual fields are full to confrontation.  III, IV, VI: Extraocular movements are intact without nystagmus. Pupils equally round and reactive to light  V:  Facial sensation V1-V3 equal and intact   VII: Face is symmetric with normal eye closure and smile  VIII: Hearing is grossly intact  IX, X: Uvula is midline and soft palate rises symmetrically  XI: Head turning and shoulder shrug are intact.  XII: Tongue protrudes midline  Motor: Normal bulk and tone. No pronator drift. Strength bilateral upper extremity 5/5, bilateral lower extremities 5/5.  Sensation: Intact to light touch bilaterally. No neglect or extinction on double simultaneous testing.  Coordination: No dysmetria on finger-to-nose bilaterally  Gait: Narrow gait and steady    LABS:                        13.2   5.51  )-----------( 222      ( 23 Mar 2022 09:49 )             39.8     03-23    134<L>  |  99  |  14  ----------------------------<  99  4.5   |  25  |  0.86    Ca    9.0      23 Mar 2022 09:49  Phos  4.0     03-23  Mg     2.0     03-23    TPro  6.3  /  Alb  4.2  /  TBili  0.3  /  DBili  x   /  AST  18  /  ALT  12  /  AlkPhos  70  03-22    PT/INR - ( 22 Mar 2022 17:39 )   PT: 11.0 sec;   INR: 0.93          PTT - ( 22 Mar 2022 17:39 )  PTT:32.9 sec  Urinalysis Basic - ( 22 Mar 2022 19:07 )    Color: Yellow / Appearance: Clear / SG: <=1.005 / pH: x  Gluc: x / Ketone: NEGATIVE  / Bili: Negative / Urobili: 0.2 E.U./dL   Blood: x / Protein: NEGATIVE mg/dL / Nitrite: NEGATIVE   Leuk Esterase: Small / RBC: < 5 /HPF / WBC < 5 /HPF   Sq Epi: x / Non Sq Epi: 0-5 /HPF / Bacteria: Present /HPF    RADIOLOGY & ADDITIONAL TESTS:    < from: JEFF w/Doppler (03.24.22 @ 15:33) >  CONCLUSIONS:     1. Normal left ventricular size and systolic function.   2. Normal right ventricular size and systolic function.   3. No LA/ROXY thrombus seen.   4. No evidence of an intracardiac shunt.   5. Aortic sclerosis without significant stenosis.   6. Mild-to-moderate aortic regurgitation.   7. Trivial pericardial effusion.    < end of copied text >

## 2022-03-24 NOTE — DISCHARGE NOTE PROVIDER - PROVIDER TOKENS
PROVIDER:[TOKEN:[[id number]]] PROVIDER:[TOKEN:[[id number]],SCHEDULEDAPPT:[04/08/2022],SCHEDULEDAPPTTIME:[09:40 AM]]

## 2022-03-24 NOTE — DISCHARGE NOTE PROVIDER - NSDCQMERRANDS_GEN_ALL_CORE
Extended Emergency Contact Information  Primary Emergency Contact: Sona Sanchez   [facility]  Home Phone: [phone]  Mobile Phone: [phone]  Relation: Spouse    Aleksandr Velazquez MD  [address] [place] [address]    Future Appointments   Date Time Provider Department Center   2/13/2019  9:00 AM [facility]-CT1 [address] [facility]   2/13/2019  9:15 AM [facility]-CT1 [address] [facility]   2/13/2019  9:30 AM LAB, HEMONC CANCER BLDG [facility]   2/13/2019 10:40 AM Monica Ahmadi MD [facility] HEM ONC [facility]     Payor: MEDICARE / Plan: MEDICARE PART A & B / Product Type: [employer] /       [facility] [address] [place] [address]  Phone: [phone] Fax: [phone]       01/03/19 1508   Discharge Assessment   Assessment Type Discharge Planning Assessment   Confirmed/corrected address and phone number on facesheet? Yes   Assessment information obtained from? Medical Record;Caregiver   Expected Length of Stay (days) 2   Communicated expected length of stay with patient/caregiver yes   Prior to hospitilization cognitive status: Alert/Oriented   Prior to hospitalization functional status: Assistive Equipment   Current cognitive status: Alert/Oriented   Current Functional Status: Assistive Equipment   Lives With spouse   Able to Return to Prior Arrangements yes   Is patient able to care for self after discharge? Yes   Patient's perception of discharge disposition home or selfcare   Readmission Within the Last 30 Days no previous admission in last 30 days   Patient currently being followed by outpatient case management? No   Patient currently receives any other outside agency services? No   Equipment Currently Used at Home rollator;shower chair;grab bar;dressing device   Do you have any problems affording any of your prescribed medications? No   Is the patient taking medications as prescribed?  no   Does the patient have transportation home? Yes   Does the patient receive services at the [facility]? No   Discharge Plan A Home   Discharge Plan B Home Health   Patient/Family in Agreement with Plan yes        No

## 2022-03-24 NOTE — SPEECH LANGUAGE PATHOLOGY EVALUATION - SLP DIAGNOSIS
Pt p/w functional expressive-receptive language skills on the conversational level c/w pt and RN reports. No need for therapeutic intervention at this time.

## 2022-03-24 NOTE — DISCHARGE NOTE PROVIDER - NSDCMRMEDTOKEN_GEN_ALL_CORE_FT
atorvastatin 10 mg oral tablet: 1 tab(s) orally once a day  CeleXA 20 mg oral tablet: 1 tab(s) orally once a day  Dovato 50 mg-300 mg oral tablet: 1 tab(s) orally once a day   aspirin 81 mg oral delayed release tablet: 1 tab(s) orally once a day  CeleXA 20 mg oral tablet: 1 tab(s) orally once a day  clopidogrel 75 mg oral tablet: 1 tab(s) orally once a day  Dovato 50 mg-300 mg oral tablet: 1 tab(s) orally once a day  Lipitor 40 mg oral tablet: 1 tab(s) orally once a day (at bedtime)

## 2022-03-24 NOTE — SPEECH LANGUAGE PATHOLOGY EVALUATION - COMMENTS
Per RN, pt is able to fully express wants/needs and participate in conversation without difficulty. No further f/u is warranted at this time. This service is s/o. Reconsult PRN. Pt reported that she is a retired  as a second language. 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 She denied any current difficulties with speech-language and stated that her skills have returned to baseline.

## 2022-03-24 NOTE — DISCHARGE NOTE PROVIDER - NSDCCPCAREPLAN_GEN_ALL_CORE_FT
PRINCIPAL DISCHARGE DIAGNOSIS  Diagnosis: TIA (transient ischemic attack)  Assessment and Plan of Treatment: You were admitted to the hospital because you had symptoms of slurred speech with word finding difficulty, which resolved. This is called a transient ischemic attack, or TIA. This is when a blood clot temporarily blocks a blood vessel in your brain, but does not last long enough to cause permanent damage in your brain. A TIA is a warning sign of a future stroke, which can permanently damage areas in the brain that control parts of the body. It is important to treat a TIA to prevent strokes.   You have these risks factors of TIA and future strokes. Please see secondary diagnoses for further explanation:   -high cholesterol (also called hyperlipidemia)  Please take your aspirin and plavix for blood thinning and Atorvastatin for cholesterol medication/blood vessel protection as prescribed to prevent further strokes. Do not skip doses and do not run low on your medication. If you run low on your medication, please contact your doctor.  You will follow up outpatient with the stroke Nurse Practitioner/doctor as scheduled below.  Call 911 if you or someone you know experiences the following symptoms of stroke (can be remembered by BE FAST):  •Balance: Dizziness, loss of balance, or a sense of falling  •Eyes: Sudden double vision or blurred vision  •Face: drooping of one side of the face  •Arm: arm weakness  •Speech:  Sudden trouble talking or slurred speech, trouble understanding others  •Time: Time to call for an ambulance fast!         PRINCIPAL DISCHARGE DIAGNOSIS  Diagnosis: TIA (transient ischemic attack)  Assessment and Plan of Treatment: You were admitted to the hospital because you had symptoms of slurred speech with word finding difficulty, which resolved. This is called a transient ischemic attack, or TIA. This is when a blood clot temporarily blocks a blood vessel in your brain, but does not last long enough to cause permanent damage in your brain. A TIA is a warning sign of a future stroke, which can permanently damage areas in the brain that control parts of the body. It is important to treat a TIA to prevent strokes.   You have these risks factors of TIA and future strokes. Please see secondary diagnoses for further explanation:   -high cholesterol (also called hyperlipidemia)  Please take your aspirin and plavix for blood thinning and Atorvastatin for cholesterol medication/blood vessel protection as prescribed to prevent further strokes. Do not skip doses and do not run low on your medication. If you run low on your medication, please contact your doctor.  You will follow up outpatient with the stroke Nurse Practitioner/doctor as scheduled below.  Call 911 if you or someone you know experiences the following symptoms of stroke (can be remembered by BE FAST):  •Balance: Dizziness, loss of balance, or a sense of falling  •Eyes: Sudden double vision or blurred vision  •Face: drooping of one side of the face  •Arm: arm weakness  •Speech:  Sudden trouble talking or slurred speech, trouble understanding others  •Time: Time to call for an ambulance fast!        SECONDARY DISCHARGE DIAGNOSES  Diagnosis: Hyponatremia with normal extracellular fluid volume  Assessment and Plan of Treatment: Your sodium levels have been slightly lower than normal during this admission. It is important that you increase your salt intake and avoid overhydration. Please follow up with your PCP for your annual appointment on 3/28. On discharge, sodium is 131.

## 2022-03-24 NOTE — PROGRESS NOTE ADULT - ASSESSMENT
87y Female with PMHx of HLD (on home statin), HIV (on home ARV) presents to  ED today. Explains at 3:30pm today, she was on the phone and had word finding difficulty that lasted 30 mins then resolved in setting of CTH, CTA, CT H/N unremarkable, pending further work up and medicine following for comanagement.    #CVA work up  #HIV  #HLD  #macrocytosis    Recommendations:  -f/u JEFF  -plan for ILR today per primary team  -recommend TSH, vit b12, folate  -a1c 5.6; lipid panel wnl  -continue HIV medications   -continue statin, ASA, plavix per primary team   87y Female with PMHx of HLD (on home statin), HIV (on home ARV) presents to  ED today. Explains at 3:30pm today, she was on the phone and had word finding difficulty that lasted 30 mins then resolved in setting of CTH, CTA, CT H/N unremarkable, pending further work up and medicine following for comanagement.    #CVA work up  #HIV  #HLD  #macrocytosis    Recommendations:  -plan for ILR today per primary team  -tsh, b12, folate wnl  -a1c 5.6; lipid panel wnl  -continue HIV medications   -continue statin, ASA, plavix per primary team

## 2022-03-24 NOTE — PROGRESS NOTE ADULT - SUBJECTIVE AND OBJECTIVE BOX
Patient is a 87y old  Female who presents with a chief complaint of stroke code (23 Mar 2022 08:40)      HPI:  As per Neurology H&P,  "87y Female with PMHx of HLD (on home statin), HIV (on home ARV) presents to  ED today. Explains at 3:30pm today, she was on the phone and had word finding difficulty that lasted 30 mins then resolved. Symptoms had resolved in the ED. Stroke code called. Denied headache, slurred speech, new onset weakness or numbness, vision loss, facial droop, unsteady gait."    Subjective:  This morning patient reports she feels improved - speech back at baseline. Denies any palpitations/chest pain/weakness/numbness/tingling or any other concerns. Says she has never had a similar episode like this in the past. Patient with adherence to medications at home - no recent changes or missed doses. No sick contacts. Patient says she drinks a glass of wine occasionally at night - no hx of tremors/withdrawals. No hx of intubations or seizures. Denies tobacco use.         Review of Systems: 12 point review of systems otherwise negative    MEDICATIONS  (STANDING):  aspirin enteric coated 81 milliGRAM(s) Oral daily  atorvastatin 40 milliGRAM(s) Oral at bedtime  citalopram 20 milliGRAM(s) Oral daily  clopidogrel Tablet 75 milliGRAM(s) Oral daily  dolutegravir 50 milliGRAM(s) Oral daily  enoxaparin Injectable 40 milliGRAM(s) SubCutaneous every 24 hours  influenza  Vaccine (HIGH DOSE) 0.7 milliLiter(s) IntraMuscular once  lamiVUDine 300 milliGRAM(s) Oral daily    MEDICATIONS  (PRN):      Allergies    Paxil (Unknown)  penicillins (Unknown)  sulfa drugs (Rash)  Terramycin (Unknown)  vancomycin (Rash)    Intolerances          Vital Signs Last 24 Hrs  T(C): 36.7 (24 Mar 2022 13:44), Max: 36.7 (23 Mar 2022 14:00)  T(F): 98.1 (24 Mar 2022 13:44), Max: 98.1 (24 Mar 2022 10:21)  HR: 64 (24 Mar 2022 08:11) (56 - 66)  BP: 134/64 (24 Mar 2022 08:11) (134/64 - 145/65)  BP(mean): 91 (24 Mar 2022 08:11) (91 - 101)  RR: 16 (24 Mar 2022 08:11) (16 - 18)  SpO2: 94% (24 Mar 2022 08:11) (94% - 96%)    POCT Blood Glucose.: 109 mg/dL (22 Mar 2022 16:58)        Physical Exam:    Daily Height in cm: 157.48 (22 Mar 2022 16:58)    Daily   General:  Well appearing, NAD, not cachetic  HEENT:  Nonicteric, PERRLA  03-24    133<L>  |  99  |  15  ----------------------------<  99  5.2   |  27  |  0.85    Ca    9.4      24 Mar 2022 10:08  Phos  4.2     03-24  Mg     2.1     03-24    TPro  6.3  /  Alb  4.2  /  TBili  0.3  /  DBili  x   /  AST  18  /  ALT  12  /  AlkPhos  70  03-22  CV:  RRR, no murmur, no JVD  Lungs:  CTA B/L, no wheezes, rales, rhonchi  Abdomen:  Soft, non-tender, no distended, positive BS, no hepatosplenomegaly  Extremities:  2+ pulses, no c/c, no edema  Skin:  Warm and dry, no rashes  :  No esparza  Neuro:  AAOx3, non-focal, CN II-XII grossly intact, sensation and strength intact b/l   No Restraints    LABS:                          14.2   4.91  )-----------( 238      ( 24 Mar 2022 10:08 )             43.0             RADIOLOGY & ADDITIONAL TESTS:  reviewed          Patient is a 87y old  Female who presents with a chief complaint of stroke code (23 Mar 2022 08:40)        Subjective:  Patient feels well this morning - no new concerns or complaints.        Review of Systems: 12 point review of systems otherwise negative    MEDICATIONS  (STANDING):  aspirin enteric coated 81 milliGRAM(s) Oral daily  atorvastatin 40 milliGRAM(s) Oral at bedtime  citalopram 20 milliGRAM(s) Oral daily  clopidogrel Tablet 75 milliGRAM(s) Oral daily  dolutegravir 50 milliGRAM(s) Oral daily  enoxaparin Injectable 40 milliGRAM(s) SubCutaneous every 24 hours  influenza  Vaccine (HIGH DOSE) 0.7 milliLiter(s) IntraMuscular once  lamiVUDine 300 milliGRAM(s) Oral daily    MEDICATIONS  (PRN):      Allergies    Paxil (Unknown)  penicillins (Unknown)  sulfa drugs (Rash)  Terramycin (Unknown)  vancomycin (Rash)    Intolerances          Vital Signs Last 24 Hrs  T(C): 36.7 (24 Mar 2022 13:44), Max: 36.7 (23 Mar 2022 14:00)  T(F): 98.1 (24 Mar 2022 13:44), Max: 98.1 (24 Mar 2022 10:21)  HR: 64 (24 Mar 2022 08:11) (56 - 66)  BP: 134/64 (24 Mar 2022 08:11) (134/64 - 145/65)  BP(mean): 91 (24 Mar 2022 08:11) (91 - 101)  RR: 16 (24 Mar 2022 08:11) (16 - 18)  SpO2: 94% (24 Mar 2022 08:11) (94% - 96%)    POCT Blood Glucose.: 109 mg/dL (22 Mar 2022 16:58)        Physical Exam:    Daily Height in cm: 157.48 (22 Mar 2022 16:58)    Daily   General:  Well appearing, NAD, not cachetic  HEENT:  Nonicteric, PERRLA  03-24    133<L>  |  99  |  15  ----------------------------<  99  5.2   |  27  |  0.85    Ca    9.4      24 Mar 2022 10:08  Phos  4.2     03-24  Mg     2.1     03-24    TPro  6.3  /  Alb  4.2  /  TBili  0.3  /  DBili  x   /  AST  18  /  ALT  12  /  AlkPhos  70  03-22  CV:  RRR, no murmur, no JVD  Lungs:  CTA B/L, no wheezes, rales, rhonchi  Abdomen:  Soft, non-tender, no distended, positive BS, no hepatosplenomegaly  Extremities:  2+ pulses, no c/c, no edema  Skin:  Warm and dry, no rashes  :  No esparza  Neuro:  AAOx3, non-focal, CN II-XII grossly intact, sensation and strength intact b/l   No Restraints    LABS:                          14.2   4.91  )-----------( 238      ( 24 Mar 2022 10:08 )             43.0             RADIOLOGY & ADDITIONAL TESTS:  reviewed

## 2022-03-25 VITALS — HEART RATE: 70 BPM | TEMPERATURE: 99 F

## 2022-03-25 LAB
ANION GAP SERPL CALC-SCNC: 9 MMOL/L — SIGNIFICANT CHANGE UP (ref 5–17)
BUN SERPL-MCNC: 16 MG/DL — SIGNIFICANT CHANGE UP (ref 7–23)
CALCIUM SERPL-MCNC: 8.8 MG/DL — SIGNIFICANT CHANGE UP (ref 8.4–10.5)
CHLORIDE SERPL-SCNC: 100 MMOL/L — SIGNIFICANT CHANGE UP (ref 96–108)
CO2 SERPL-SCNC: 22 MMOL/L — SIGNIFICANT CHANGE UP (ref 22–31)
CREAT ?TM UR-MCNC: 95 MG/DL — SIGNIFICANT CHANGE UP
CREAT SERPL-MCNC: 0.75 MG/DL — SIGNIFICANT CHANGE UP (ref 0.5–1.3)
EGFR: 78 ML/MIN/1.73M2 — SIGNIFICANT CHANGE UP
GLUCOSE SERPL-MCNC: 94 MG/DL — SIGNIFICANT CHANGE UP (ref 70–99)
HCT VFR BLD CALC: 41.1 % — SIGNIFICANT CHANGE UP (ref 34.5–45)
HGB BLD-MCNC: 13.4 G/DL — SIGNIFICANT CHANGE UP (ref 11.5–15.5)
MAGNESIUM SERPL-MCNC: 2 MG/DL — SIGNIFICANT CHANGE UP (ref 1.6–2.6)
MCHC RBC-ENTMCNC: 32.6 GM/DL — SIGNIFICANT CHANGE UP (ref 32–36)
MCHC RBC-ENTMCNC: 33.7 PG — SIGNIFICANT CHANGE UP (ref 27–34)
MCV RBC AUTO: 103.3 FL — HIGH (ref 80–100)
NRBC # BLD: 0 /100 WBCS — SIGNIFICANT CHANGE UP (ref 0–0)
OSMOLALITY UR: 572 MOSM/KG — SIGNIFICANT CHANGE UP (ref 300–900)
PHOSPHATE SERPL-MCNC: 3.7 MG/DL — SIGNIFICANT CHANGE UP (ref 2.5–4.5)
PLATELET # BLD AUTO: 223 K/UL — SIGNIFICANT CHANGE UP (ref 150–400)
POTASSIUM SERPL-MCNC: 4.5 MMOL/L — SIGNIFICANT CHANGE UP (ref 3.5–5.3)
POTASSIUM SERPL-SCNC: 4.5 MMOL/L — SIGNIFICANT CHANGE UP (ref 3.5–5.3)
RBC # BLD: 3.98 M/UL — SIGNIFICANT CHANGE UP (ref 3.8–5.2)
RBC # FLD: 12.2 % — SIGNIFICANT CHANGE UP (ref 10.3–14.5)
SODIUM SERPL-SCNC: 131 MMOL/L — LOW (ref 135–145)
SODIUM UR-SCNC: 58 MMOL/L — SIGNIFICANT CHANGE UP
WBC # BLD: 7.78 K/UL — SIGNIFICANT CHANGE UP (ref 3.8–10.5)
WBC # FLD AUTO: 7.78 K/UL — SIGNIFICANT CHANGE UP (ref 3.8–10.5)

## 2022-03-25 PROCEDURE — 93312 ECHO TRANSESOPHAGEAL: CPT

## 2022-03-25 PROCEDURE — 82607 VITAMIN B-12: CPT

## 2022-03-25 PROCEDURE — 85025 COMPLETE CBC W/AUTO DIFF WBC: CPT

## 2022-03-25 PROCEDURE — 92523 SPEECH SOUND LANG COMPREHEN: CPT

## 2022-03-25 PROCEDURE — 80053 COMPREHEN METABOLIC PANEL: CPT

## 2022-03-25 PROCEDURE — 80061 LIPID PANEL: CPT

## 2022-03-25 PROCEDURE — 83735 ASSAY OF MAGNESIUM: CPT

## 2022-03-25 PROCEDURE — 83036 HEMOGLOBIN GLYCOSYLATED A1C: CPT

## 2022-03-25 PROCEDURE — 85730 THROMBOPLASTIN TIME PARTIAL: CPT

## 2022-03-25 PROCEDURE — 84443 ASSAY THYROID STIM HORMONE: CPT

## 2022-03-25 PROCEDURE — 70498 CT ANGIOGRAPHY NECK: CPT | Mod: MA

## 2022-03-25 PROCEDURE — 84484 ASSAY OF TROPONIN QUANT: CPT

## 2022-03-25 PROCEDURE — C1764: CPT

## 2022-03-25 PROCEDURE — 70496 CT ANGIOGRAPHY HEAD: CPT | Mod: MA

## 2022-03-25 PROCEDURE — 99239 HOSP IP/OBS DSCHRG MGMT >30: CPT

## 2022-03-25 PROCEDURE — 99232 SBSQ HOSP IP/OBS MODERATE 35: CPT

## 2022-03-25 PROCEDURE — 97161 PT EVAL LOW COMPLEX 20 MIN: CPT

## 2022-03-25 PROCEDURE — 84300 ASSAY OF URINE SODIUM: CPT

## 2022-03-25 PROCEDURE — 80048 BASIC METABOLIC PNL TOTAL CA: CPT

## 2022-03-25 PROCEDURE — 93306 TTE W/DOPPLER COMPLETE: CPT

## 2022-03-25 PROCEDURE — 82962 GLUCOSE BLOOD TEST: CPT

## 2022-03-25 PROCEDURE — 93005 ELECTROCARDIOGRAM TRACING: CPT

## 2022-03-25 PROCEDURE — 83935 ASSAY OF URINE OSMOLALITY: CPT

## 2022-03-25 PROCEDURE — 0042T: CPT

## 2022-03-25 PROCEDURE — U0003: CPT

## 2022-03-25 PROCEDURE — 70450 CT HEAD/BRAIN W/O DYE: CPT | Mod: MA

## 2022-03-25 PROCEDURE — 85610 PROTHROMBIN TIME: CPT

## 2022-03-25 PROCEDURE — 82746 ASSAY OF FOLIC ACID SERUM: CPT

## 2022-03-25 PROCEDURE — 82570 ASSAY OF URINE CREATININE: CPT

## 2022-03-25 PROCEDURE — 70551 MRI BRAIN STEM W/O DYE: CPT

## 2022-03-25 PROCEDURE — 99285 EMERGENCY DEPT VISIT HI MDM: CPT

## 2022-03-25 PROCEDURE — U0005: CPT

## 2022-03-25 PROCEDURE — 84100 ASSAY OF PHOSPHORUS: CPT

## 2022-03-25 PROCEDURE — 81001 URINALYSIS AUTO W/SCOPE: CPT

## 2022-03-25 PROCEDURE — 84439 ASSAY OF FREE THYROXINE: CPT

## 2022-03-25 PROCEDURE — 36415 COLL VENOUS BLD VENIPUNCTURE: CPT

## 2022-03-25 PROCEDURE — 85027 COMPLETE CBC AUTOMATED: CPT

## 2022-03-25 RX ORDER — ASPIRIN/CALCIUM CARB/MAGNESIUM 324 MG
1 TABLET ORAL
Qty: 30 | Refills: 0
Start: 2022-03-25 | End: 2022-04-23

## 2022-03-25 RX ORDER — CLOPIDOGREL BISULFATE 75 MG/1
1 TABLET, FILM COATED ORAL
Qty: 30 | Refills: 0
Start: 2022-03-25 | End: 2022-04-23

## 2022-03-25 RX ORDER — ATORVASTATIN CALCIUM 80 MG/1
1 TABLET, FILM COATED ORAL
Qty: 0 | Refills: 0 | DISCHARGE

## 2022-03-25 RX ORDER — ATORVASTATIN CALCIUM 80 MG/1
1 TABLET, FILM COATED ORAL
Qty: 30 | Refills: 1
Start: 2022-03-25 | End: 2022-05-23

## 2022-03-25 RX ADMIN — Medication 81 MILLIGRAM(S): at 11:48

## 2022-03-25 RX ADMIN — Medication 300 MILLIGRAM(S): at 11:47

## 2022-03-25 RX ADMIN — CITALOPRAM 20 MILLIGRAM(S): 10 TABLET, FILM COATED ORAL at 11:48

## 2022-03-25 RX ADMIN — DOLUTEGRAVIR SODIUM 50 MILLIGRAM(S): 25 TABLET, FILM COATED ORAL at 11:48

## 2022-03-25 RX ADMIN — CLOPIDOGREL BISULFATE 75 MILLIGRAM(S): 75 TABLET, FILM COATED ORAL at 11:48

## 2022-03-25 NOTE — DISCHARGE NOTE NURSING/CASE MANAGEMENT/SOCIAL WORK - NSDCPEFALRISK_GEN_ALL_CORE
For information on Fall & Injury Prevention, visit: [email or web address] OR  [email or web address] OR  https://www.cdc.gov/steadi/patient.html

## 2022-03-25 NOTE — DISCHARGE NOTE NURSING/CASE MANAGEMENT/SOCIAL WORK - PATIENT PORTAL LINK FT
You can access the FollowMyHealth Patient Portal offered by [facility] by registering at the following website: [email or web address]. By joining [facility]’s FollowMyHealth portal, you will also be able to view your health information using other applications (apps) compatible with our system.

## 2022-03-25 NOTE — PROGRESS NOTE ADULT - ASSESSMENT
87y Female with PMHx of HLD (on home statin), HIV (on home ARV) presents to  ED today. Explains at 3:30pm today, she was on the phone and had word finding difficulty that lasted 30 mins then resolved in setting of CTH, CTA, CT H/N unremarkable, pending further work up and medicine following for comanagement.    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

## 2022-03-25 NOTE — PROGRESS NOTE ADULT - ASSESSMENT
87y Female with PMHx of HLD (on home atorvastatin 10mg), HIV (on home Dovato) presents to  ED on 3/22 for word finding difficulty that lasted 30 mins then resolved. Stroke code called. CTH, CTP, CTA H/N unremarkable. Concern for TIA. Admitted to stroke-telemetry service for further stroke work up given risk factors.    Neuro  #CVA workup  - continue aspirin 81mg and plavix 75mg daily  - continue atorvastatin 40mg daily, is on 10mg at home  - q4hr stroke neuro checks and vitals  - MRI brain: no acute infarct  - Stroke Code HCT Results: No acute intracranial hemorrhage or transcortical infarction.  - Stroke Code CTA Results: Unremarkable. No intracranial arterial occlusion or high-grade stenosis. No carotid or vertebral artery steno-occlusive disease or dissection.  - CTP:  Negative CT perfusion study.  - Stroke education    Cards  - Goal -180  - TTE: normal bilateral ventricular function, normal atria, EF 65-70%  - JEFF: negative  - ILR placed 3/24  - Stroke Code EKG Results: NSR, no ischemia    #HLD  - high dose statin as above in CVA,   - LDL: 96    Pulm  - call provider if SPO2 < 94%    GI  #Nutrition/Fluids/Electrolytes   - replete K<4 and Mg <2  - Diet: dash  - IVF: n/a    Renal  - Bun/Cr: 14/0.86    Infectious Disease  # HIV  - confirmed meds with Dr. Cesar (patient's ID physician)  - c/w Dovato  - CD4 554, VL undetectable as of 2/18/22    Endocrine  - f/u TSH  - A1C: 5.6    DVT Prophylaxis  - lovenox sq for DVT prophylaxis   - SCDs for DVT prophylaxis     IDR Goals: Goals reviewed at interdisciplinary rounds with case management, social work, physical therapy, occupational therapy, and speech language pathology.   Please see specific therapy  notes for in depth goals.    Dispo: home with continued outpatient PT today     Discussed daily hospital plans and goals with patient at bedside.     Discussed with Neurology Attending Dr. Rose

## 2022-03-25 NOTE — PROGRESS NOTE ADULT - SUBJECTIVE AND OBJECTIVE BOX
Neurology Stroke Progress Note    INTERVAL HPI/OVERNIGHT EVENTS:  No acute overnight events. Patient seen and examined with attending at bedside. States she is feeling great. Has no questions, ready for discharge.     MEDICATIONS  (STANDING):  aspirin enteric coated 81 milliGRAM(s) Oral daily  atorvastatin 40 milliGRAM(s) Oral at bedtime  citalopram 20 milliGRAM(s) Oral daily  clopidogrel Tablet 75 milliGRAM(s) Oral daily  dolutegravir 50 milliGRAM(s) Oral daily  enoxaparin Injectable 40 milliGRAM(s) SubCutaneous every 24 hours  influenza  Vaccine (HIGH DOSE) 0.7 milliLiter(s) IntraMuscular once  lamiVUDine 300 milliGRAM(s) Oral daily    MEDICATIONS  (PRN):      Allergies    Paxil (Unknown)  penicillins (Unknown)  sulfa drugs (Rash)  Terramycin (Unknown)  vancomycin (Rash)    Intolerances        Vital Signs Last 24 Hrs  T(C): 37.1 (25 Mar 2022 05:29), Max: 37.2 (25 Mar 2022 01:00)  T(F): 98.8 (25 Mar 2022 05:29), Max: 99 (25 Mar 2022 01:00)  HR: 64 (25 Mar 2022 04:42) (64 - 68)  BP: 135/63 (25 Mar 2022 04:42) (126/60 - 154/64)  BP(mean): 90 (25 Mar 2022 04:42) (86 - 96)  RR: 18 (25 Mar 2022 04:42) (18 - 18)  SpO2: 94% (25 Mar 2022 04:42) (92% - 100%)    Physical exam:  General: No acute distress, awake and alert  Eyes: Anicteric sclerae, moist conjunctivae, see below for CNs  Neck: trachea midline  Cardiovascular: Regular rate and rhythm, no murmurs, rubs, or gallops.   Pulmonary: Anterior breath sounds clear bilaterally, no crackles or wheezing. No use of accessory muscles  GI: Abdomen soft, non-distended, non-tender  Extremities: Radial and DP pulses +2, no edema    Neurologic:  -Mental status: Awake, alert, oriented to person, place, and time. Speech is fluent with intact naming, repetition, and comprehension, no dysarthria. Recent and remote memory intact. Follows commands. Attention/concentration intact. Fund of knowledge appropriate.  -Cranial nerves:   II: Visual fields are full to confrontation.  III, IV, VI: Extraocular movements are intact without nystagmus. Pupils equally round and reactive to light  V:  Facial sensation V1-V3 equal and intact   VII: Face is symmetric with normal eye closure and smile  VIII: Hearing is grossly intact  IX, X: Uvula is midline and soft palate rises symmetrically  XI: Head turning and shoulder shrug are intact.  XII: Tongue protrudes midline  Motor: Normal bulk and tone. No pronator drift. Strength bilateral upper extremity 5/5, bilateral lower extremities 5/5.  Sensation: Intact to light touch bilaterally. No neglect or extinction on double simultaneous testing.  Coordination: No dysmetria on finger-to-nose bilaterally  Gait: Narrow gait and steady    LABS:                        13.4   7.78  )-----------( 223      ( 25 Mar 2022 06:48 )             41.1     03-25    131<L>  |  100  |  16  ----------------------------<  94  4.5   |  22  |  0.75    Ca    8.8      25 Mar 2022 06:48  Phos  3.7     03-25  Mg     2.0     03-25        RADIOLOGY & ADDITIONAL TESTS:

## 2022-03-25 NOTE — PROGRESS NOTE ADULT - SUBJECTIVE AND OBJECTIVE BOX
Patient is a 87y old  Female who presents with a chief complaint of stroke code (23 Mar 2022 08:40)      Subjective:  Patient feels well this morning, examined at bedside - eating breakfast.   No blurry vision/HA/weakness or any other concerns.      Review of Systems: 12 point review of systems otherwise negative    Medications:  reviewed; sunrise unable to upload      Allergies    Paxil (Unknown)  penicillins (Unknown)  sulfa drugs (Rash)  Terramycin (Unknown)  vancomycin (Rash)    Intolerances          Vital Signs Last 24 Hrs  reviewed; sunrise unable to upload    Physical Exam:    Daily Height in cm: 157.48 (22 Mar 2022 16:58)    Daily   General:  Well appearing, NAD, not cachetic  HEENT:  Nonicteric, PERRLA  CV:  RRR, no murmur, no JVD  Lungs:  CTA B/L, no wheezes, rales, rhonchi  Abdomen:  Soft, non-tender, no distended, positive BS, no hepatosplenomegaly  Extremities:  2+ pulses, no c/c, no edema  Skin:  Warm and dry, no rashes  :  No esparza  Neuro:  AAOx3, non-focal, CN II-XII grossly intact, sensation and strength intact b/l   No Restraints    LABS:  reviewed; sunrise unable to upload      RADIOLOGY & ADDITIONAL TESTS:  reviewed          Patient is a 87y old  Female who presents with a chief complaint of stroke code (23 Mar 2022 08:40)      Subjective:  Patient feels well this morning, examined at bedside - eating breakfast.   No blurry vision/HA/weakness or any other concerns.      Review of Systems: 12 point review of systems otherwise negative    MEDICATIONS  (STANDING):  aspirin enteric coated 81 milliGRAM(s) Oral daily  atorvastatin 40 milliGRAM(s) Oral at bedtime  citalopram 20 milliGRAM(s) Oral daily  clopidogrel Tablet 75 milliGRAM(s) Oral daily  dolutegravir 50 milliGRAM(s) Oral daily  enoxaparin Injectable 40 milliGRAM(s) SubCutaneous every 24 hours  influenza  Vaccine (HIGH DOSE) 0.7 milliLiter(s) IntraMuscular once  lamiVUDine 300 milliGRAM(s) Oral daily    MEDICATIONS  (PRN):    Allergies    Paxil (Unknown)  penicillins (Unknown)  sulfa drugs (Rash)  Terramycin (Unknown)  vancomycin (Rash)    Intolerances      Vital Signs Last 24 Hrs  T(C): 37.1 (25 Mar 2022 13:46), Max: 37.2 (25 Mar 2022 01:00)  T(F): 98.7 (25 Mar 2022 13:46), Max: 99 (25 Mar 2022 01:00)  HR: 62 (25 Mar 2022 09:00) (62 - 68)  BP: 123/56 (25 Mar 2022 09:00) (123/56 - 139/67)  BP(mean): 80 (25 Mar 2022 09:00) (80 - 96)  RR: 18 (25 Mar 2022 09:00) (18 - 18)  SpO2: 99% (25 Mar 2022 09:00) (92% - 99%)    Physical Exam:    Daily Height in cm: 157.48 (22 Mar 2022 16:58)    Daily   General:  Well appearing, NAD, not cachetic  HEENT:  Nonicteric, PERRLA  CV:  RRR, no murmur, no JVD  Lungs:  CTA B/L, no wheezes, rales, rhonchi  Abdomen:  Soft, non-tender, no distended, positive BS, no hepatosplenomegaly  Extremities:  2+ pulses, no c/c, no edema  Skin:  Warm and dry, no rashes  :  No esparza  Neuro:  AAOx3, non-focal, CN II-XII grossly intact, sensation and strength intact b/l   No Restraints    LABS:                        13.4   7.78  )-----------( 223      ( 25 Mar 2022 06:48 )             41.1     03-25    131<L>  |  100  |  16  ----------------------------<  94  4.5   |  22  |  0.75    Ca    8.8      25 Mar 2022 06:48  Phos  3.7     03-25  Mg     2.0     03-25          RADIOLOGY & ADDITIONAL TESTS:  reviewed

## 2022-03-25 NOTE — PROGRESS NOTE ADULT - NS ATTEND AMEND GEN_ALL_CORE FT
The patient is a very pleasant 87-year-old female with a history of hyperlipidemia and HIV (well controlled on therapy) who was admitted after a 30-minute spell of garbled speech.  There was no clear associated focal neurologic deficit otherwise.  Initial CT head, CTA head and neck, and CTP were unremarkable.  MRI was negative for acute ischemia but this does not rule out TIA given the relatively brief nature of the symptoms.  JEFF was unrmarkable. She is s/p ILR placement.  For now, continue dapt, statin. BP goal: normotension. DC today; OP stroke f/u.
The patient is a very pleasant 87-year-old female with a history of hyperlipidemia and HIV (well controlled on therapy) who was admitted after a 30-minute spell of garbled speech.  There was no clear associated focal neurologic deficit otherwise.  Initial CT head, CTA head and neck, and CTP were unremarkable.  MRI was negative for acute ischemia but this does not rule out TIA given the relatively brief nature of the symptoms.  JEFF is pending.  She is s/p ILR placement.  For now, continue dapt, statin. BP goal: normotension
The patient is a very pleasant 87-year-old female with a history of hyperlipidemia and HIV (well controlled on therapy) who was admitted after a 30-minute spell of garbled speech.  There was no clear associated focal neurologic deficit otherwise.  Initial CT head, CTA head and neck, and CTP were unremarkable.  MRI was negative for acute ischemia but does not rule out TIA given the relatively brief nature of the symptoms.  TTE and JEFF are pending.  She will likely benefit from ILR placement.  Though seizure is also on the differential, will hold off on EEG for now given higher suspicion for TIA

## 2022-03-29 DIAGNOSIS — R47.9 UNSPECIFIED SPEECH DISTURBANCES: ICD-10-CM

## 2022-03-29 DIAGNOSIS — E78.5 HYPERLIPIDEMIA, UNSPECIFIED: ICD-10-CM

## 2022-03-29 DIAGNOSIS — E87.1 HYPO-OSMOLALITY AND HYPONATREMIA: ICD-10-CM

## 2022-03-29 DIAGNOSIS — Z88.2 ALLERGY STATUS TO SULFONAMIDES: ICD-10-CM

## 2022-03-29 DIAGNOSIS — D75.89 OTHER SPECIFIED DISEASES OF BLOOD AND BLOOD-FORMING ORGANS: ICD-10-CM

## 2022-03-29 DIAGNOSIS — G45.9 TRANSIENT CEREBRAL ISCHEMIC ATTACK, UNSPECIFIED: ICD-10-CM

## 2022-03-29 DIAGNOSIS — Z21 ASYMPTOMATIC HUMAN IMMUNODEFICIENCY VIRUS [HIV] INFECTION STATUS: ICD-10-CM

## 2022-03-29 DIAGNOSIS — Z88.0 ALLERGY STATUS TO PENICILLIN: ICD-10-CM

## 2022-04-08 ENCOUNTER — APPOINTMENT (OUTPATIENT)
Dept: NEUROLOGY | Facility: CLINIC | Age: 87
End: 2022-04-08
Payer: MEDICARE

## 2022-04-08 VITALS
WEIGHT: 11 LBS | HEIGHT: 62 IN | SYSTOLIC BLOOD PRESSURE: 116 MMHG | TEMPERATURE: 97.9 F | DIASTOLIC BLOOD PRESSURE: 66 MMHG | BODY MASS INDEX: 2.02 KG/M2

## 2022-04-08 PROCEDURE — 99215 OFFICE O/P EST HI 40 MIN: CPT

## 2022-04-08 NOTE — ASSESSMENT
[FreeTextEntry1] : Pt is an 86yo F with PMHx of HLD, HIV (well controlled on therapy) presents for hospital followup visit. She had presumed TIA which presented as word finding difficulty x 30 minutes. Workup was on the whole unremarkable including MRI. She is doing well. Neurologically intact in office today.\par \par Plan \par Would recommend EEG as episode may have been atypical seizure, although patient wishes to defer. \par Continue Aspirin 81mg daily for secondary stroke prevention, which she should continue indefinitely. Will send 3 week course only of plavix to take in addition to ASA for DAPT s/p TIA. Monitor for any blood in urine or stool\par Continue Atorvastatin 40mg daily for secondary stroke prevention. Monitor for any cramps or muscle pain. Goal LDL <70.  \par Defer to EP for ILR interrogation and management \par -Counselled on healthy eating (DASH/ Mediterranean diet, limiting red meats)\par -Counselled on importance of regular exercise and remaining active\par -Continue to f/u with PCP regarding regular health maintenance and prevention, including routine screening\par -Counselled on signs of stroke BEFAST and to call 911 with any new or worsening neurological symptoms \par

## 2022-04-08 NOTE — PHYSICAL EXAM
[FreeTextEntry1] : Neurologic:\par -Mental status: Awake, alert, oriented to person, place, and time. Speech is fluent with intact naming, repetition, and comprehension, no dysarthria. Recent and remote memory intact. Follows commands. Attention/concentration intact. Fund of knowledge appropriate.\par -Cranial nerves: \par II: Visual fields are full to confrontation.\par III, IV, VI: Extraocular movements are intact without nystagmus. Pupils equally round and reactive to light\par V:  Facial sensation V1-V3 equal and intact \par VII: Face is symmetric with normal eye closure and smile\par VIII: Hearing is grossly intact\par IX, X: Uvula is midline and soft palate rises symmetrically\par XI: Head turning and shoulder shrug are intact.\par XII: Tongue protrudes midline\par Motor: Normal bulk and tone. No pronator drift. Strength bilateral upper extremity 5/5, bilateral lower extremities 5/5.\par Sensation: Intact to light touch bilaterally. No neglect or extinction on double simultaneous testing.\par Coordination: No dysmetria on finger-to-nose bilaterally\par Gait: Narrow gait and steady

## 2022-04-08 NOTE — HISTORY OF PRESENT ILLNESS
[FreeTextEntry1] : Pt is an 88yo F with PMHx of HLD, HIV (well controlled on therapy) presents for hospital followup visit. She initially presented to [facility] on 3/22 with word finding difficulty x 30 minutes before self resolving. No clear associated focal neurologic deficit otherwise. CT/ CTA imaging unremarkable. MRI brain with no acute infarct. TTE/ ERIBERTO with no significant findings. ILR placed 3/24. Patient likely with a TIA event. DAPT and statin started for secondary stroke prevention. Stroke labs- LDL 96, A1c 5.6. She was d/c home with no residual deficit. Since d/c she says she has been doing very well. She denies any new stroke- like symptoms. SHe has been tolerating ASA and higher dose of atorvastatin (was previously on 10mg) with no ADEs. She says she never received script for plavix... She has been to her PCP and had repeat labs including sodium which were reportedly normal. BP is 116/66 in office today. She is moving to [facility] next week and will be transferring all care. \par \par \par

## 2022-05-02 ENCOUNTER — NON-APPOINTMENT (OUTPATIENT)
Age: 87
End: 2022-05-02

## 2022-05-02 ENCOUNTER — APPOINTMENT (OUTPATIENT)
Dept: HEART AND VASCULAR | Facility: CLINIC | Age: 87
End: 2022-05-02
Payer: MEDICARE

## 2022-05-02 PROCEDURE — G2066: CPT

## 2022-05-02 PROCEDURE — 93298 REM INTERROG DEV EVAL SCRMS: CPT

## 2022-05-06 ENCOUNTER — RX RENEWAL (OUTPATIENT)
Age: 87
End: 2022-05-06

## 2022-06-08 ENCOUNTER — APPOINTMENT (OUTPATIENT)
Dept: HEART AND VASCULAR | Facility: CLINIC | Age: 87
End: 2022-06-08
Payer: MEDICARE

## 2022-06-08 ENCOUNTER — NON-APPOINTMENT (OUTPATIENT)
Age: 87
End: 2022-06-08

## 2022-06-08 PROCEDURE — G2066: CPT

## 2022-06-08 PROCEDURE — 93298 REM INTERROG DEV EVAL SCRMS: CPT

## 2022-06-14 ENCOUNTER — TRANSCRIPTION ENCOUNTER (OUTPATIENT)
Age: 87
End: 2022-06-14

## 2022-06-14 ENCOUNTER — NON-APPOINTMENT (OUTPATIENT)
Age: 87
End: 2022-06-14

## 2022-07-12 ENCOUNTER — APPOINTMENT (OUTPATIENT)
Dept: HEART AND VASCULAR | Facility: CLINIC | Age: 87
End: 2022-07-12

## 2022-07-12 ENCOUNTER — NON-APPOINTMENT (OUTPATIENT)
Age: 87
End: 2022-07-12

## 2022-07-12 PROCEDURE — G2066: CPT

## 2022-07-12 PROCEDURE — 93298 REM INTERROG DEV EVAL SCRMS: CPT

## 2022-07-12 NOTE — ED PROVIDER NOTE - PATIENT PORTAL LINK FT
GXT completed. You can access the FollowMyHealth Patient Portal offered by [facility] by registering at the following website: [email or web address]. By joining [employer]’s FollowMyHealth portal, you will also be able to view your health information using other applications (apps) compatible with our system.

## 2022-07-14 ENCOUNTER — APPOINTMENT (OUTPATIENT)
Dept: HEART AND VASCULAR | Facility: CLINIC | Age: 87
End: 2022-07-14

## 2022-07-14 VITALS
HEIGHT: 62 IN | BODY MASS INDEX: 21.16 KG/M2 | DIASTOLIC BLOOD PRESSURE: 74 MMHG | WEIGHT: 115 LBS | SYSTOLIC BLOOD PRESSURE: 151 MMHG | HEART RATE: 77 BPM

## 2022-07-14 PROCEDURE — 93291 INTERROG DEV EVAL SCRMS IP: CPT

## 2022-07-14 PROCEDURE — 99214 OFFICE O/P EST MOD 30 MIN: CPT | Mod: 25

## 2022-07-20 NOTE — ADDENDUM
[FreeTextEntry1] : I, Abdirahman Rowley, hereby attest that the medical record entry for this patient accurately reflects signatures/notations that I made on the Date of Service in my capacity as an Attending Physician when I treated/diagnosed the above patient. I do hereby attest that this information is true, accurate and complete to the best of my knowledge and I understand that any falsification, omission, or concealment of material fact may subject me to administrative, civil, or, criminal liability. I agree with the note as written by my PA in its entirety.\par I was present for the entire visit and supervised the entire visit and agree with the plan as outlined.\par \par

## 2022-07-20 NOTE — PROCEDURE
[de-identified] : [employer] REVEAL LINQ\par battery good\par sensing good\par 1 episode of sinus arrest for 6 seconds = HR speeds up and then down - likely neurocardiogenic\par 1 episode of AT lasting 6 seconds

## 2022-07-20 NOTE — DISCUSSION/SUMMARY
[FreeTextEntry1] : 87 year old active female with HIV, HTN and CVA s/p ILR, who presents for follow up.  Device incision well healed.  Remote monitoring is working. Review of transmission with no afib (one 6 second episode of AT).  But she has had a 6 second sinus pause.  Reviewing of tracing prior to event it is likely neurocardiogenic (HR speeds up then slows down).  She had no syncope or near syncope.  One remote episode of syncope a year ago in a time when she was very stressed and with change in position.  We discussed and reviewed images of iLR transmission in detail.  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  I have offered her a pacemaker but we also discussed it is reasonable to wait and see if she has more episodes or develops symptoms and if she does proceeding with a pacemaker.  She is interested in waiting and agrees if she has more episodes to proceed with a pacemaker.  She knows to call with any syncope or near syncope.  She knows to call with any questions or concerns and will follow up in 6 months or sooner if needed.

## 2022-07-20 NOTE — HISTORY OF PRESENT ILLNESS
[de-identified] : 87 year old active female with HIV, HTN and CVA s/p ILR, who presents for follow up.\par \par She was admitted to [facility] 3/2022 with a CVA and ultimately underwent an ILR implant.  She was called to come in given remote transmission showing a 6 second sinus pause.  She believes she was awake at the time and asymptomatic.  She had one syncopal episode a year ago when she was caring for her  while eating breakfast.  No further syncope or near syncope.  She remains active and exercises daily.  \par only time fainted was about a year ago -fainted before  passed away while eating breakfast.  \par \par She remains active ans exercises regularly.  NO palpitations, chest pain, or PRIDE.  No device related issues. \par \par

## 2022-08-18 ENCOUNTER — NON-APPOINTMENT (OUTPATIENT)
Age: 87
End: 2022-08-18

## 2022-08-18 ENCOUNTER — OUTPATIENT (OUTPATIENT)
Dept: OUTPATIENT SERVICES | Facility: HOSPITAL | Age: 87
LOS: 1 days | End: 2022-08-18

## 2022-08-18 ENCOUNTER — APPOINTMENT (OUTPATIENT)
Dept: HEART AND VASCULAR | Facility: CLINIC | Age: 87
End: 2022-08-18

## 2022-08-18 ENCOUNTER — APPOINTMENT (OUTPATIENT)
Dept: INFECTIOUS DISEASE | Facility: CLINIC | Age: 87
End: 2022-08-18

## 2022-08-18 VITALS — WEIGHT: 118 LBS | BODY MASS INDEX: 21.58 KG/M2 | TEMPERATURE: 98.5 F | OXYGEN SATURATION: 99 % | HEART RATE: 62 BPM

## 2022-08-18 VITALS — DIASTOLIC BLOOD PRESSURE: 75 MMHG | SYSTOLIC BLOOD PRESSURE: 133 MMHG

## 2022-08-18 DIAGNOSIS — Z13.820 ENCOUNTER FOR SCREENING FOR OSTEOPOROSIS: ICD-10-CM

## 2022-08-18 DIAGNOSIS — Z86.73 PERSONAL HISTORY OF TRANSIENT ISCHEMIC ATTACK (TIA), AND CEREBRAL INFARCTION W/OUT RESIDUAL DEFICITS: ICD-10-CM

## 2022-08-18 DIAGNOSIS — F41.9 ANXIETY DISORDER, UNSPECIFIED: ICD-10-CM

## 2022-08-18 DIAGNOSIS — H91.90 UNSPECIFIED HEARING LOSS, UNSPECIFIED EAR: ICD-10-CM

## 2022-08-18 DIAGNOSIS — E78.5 HYPERLIPIDEMIA, UNSPECIFIED: ICD-10-CM

## 2022-08-18 LAB
ALBUMIN SERPL ELPH-MCNC: 4.4 G/DL — SIGNIFICANT CHANGE UP (ref 3.3–5)
ALP SERPL-CCNC: 85 U/L — SIGNIFICANT CHANGE UP (ref 40–120)
ALT FLD-CCNC: 17 U/L — SIGNIFICANT CHANGE UP (ref 10–45)
ANION GAP SERPL CALC-SCNC: 10 MMOL/L — SIGNIFICANT CHANGE UP (ref 5–17)
APPEARANCE UR: CLEAR — SIGNIFICANT CHANGE UP
AST SERPL-CCNC: 23 U/L — SIGNIFICANT CHANGE UP (ref 10–40)
BACTERIA # UR AUTO: SIGNIFICANT CHANGE UP /HPF
BASOPHILS # BLD AUTO: 0.03 K/UL — SIGNIFICANT CHANGE UP (ref 0–0.2)
BASOPHILS NFR BLD AUTO: 0.5 % — SIGNIFICANT CHANGE UP (ref 0–2)
BILIRUB SERPL-MCNC: 0.6 MG/DL — SIGNIFICANT CHANGE UP (ref 0.2–1.2)
BILIRUB UR-MCNC: NEGATIVE — SIGNIFICANT CHANGE UP
BUN SERPL-MCNC: 10 MG/DL — SIGNIFICANT CHANGE UP (ref 7–23)
CALCIUM SERPL-MCNC: 9.4 MG/DL — SIGNIFICANT CHANGE UP (ref 8.4–10.5)
CHLORIDE SERPL-SCNC: 94 MMOL/L — LOW (ref 96–108)
CO2 SERPL-SCNC: 26 MMOL/L — SIGNIFICANT CHANGE UP (ref 22–31)
COLOR SPEC: YELLOW — SIGNIFICANT CHANGE UP
CREAT SERPL-MCNC: 0.83 MG/DL — SIGNIFICANT CHANGE UP (ref 0.5–1.3)
DIFF PNL FLD: ABNORMAL
EGFR: 69 ML/MIN/1.73M2 — SIGNIFICANT CHANGE UP
EOSINOPHIL # BLD AUTO: 0.05 K/UL — SIGNIFICANT CHANGE UP (ref 0–0.5)
EOSINOPHIL NFR BLD AUTO: 0.9 % — SIGNIFICANT CHANGE UP (ref 0–6)
EPI CELLS # UR: SIGNIFICANT CHANGE UP /HPF (ref 0–5)
GLUCOSE SERPL-MCNC: 92 MG/DL — SIGNIFICANT CHANGE UP (ref 70–99)
GLUCOSE UR QL: NEGATIVE — SIGNIFICANT CHANGE UP
HCT VFR BLD CALC: 42.1 % — SIGNIFICANT CHANGE UP (ref 34.5–45)
HGB BLD-MCNC: 14.2 G/DL — SIGNIFICANT CHANGE UP (ref 11.5–15.5)
IMM GRANULOCYTES NFR BLD AUTO: 0.3 % — SIGNIFICANT CHANGE UP (ref 0–1.5)
KETONES UR-MCNC: NEGATIVE — SIGNIFICANT CHANGE UP
LEUKOCYTE ESTERASE UR-ACNC: NEGATIVE — SIGNIFICANT CHANGE UP
LYMPHOCYTES # BLD AUTO: 1.57 K/UL — SIGNIFICANT CHANGE UP (ref 1–3.3)
LYMPHOCYTES # BLD AUTO: 27 % — SIGNIFICANT CHANGE UP (ref 13–44)
MCHC RBC-ENTMCNC: 33.3 PG — SIGNIFICANT CHANGE UP (ref 27–34)
MCHC RBC-ENTMCNC: 33.7 GM/DL — SIGNIFICANT CHANGE UP (ref 32–36)
MCV RBC AUTO: 98.6 FL — SIGNIFICANT CHANGE UP (ref 80–100)
MONOCYTES # BLD AUTO: 0.55 K/UL — SIGNIFICANT CHANGE UP (ref 0–0.9)
MONOCYTES NFR BLD AUTO: 9.5 % — SIGNIFICANT CHANGE UP (ref 2–14)
NEUTROPHILS # BLD AUTO: 3.6 K/UL — SIGNIFICANT CHANGE UP (ref 1.8–7.4)
NEUTROPHILS NFR BLD AUTO: 61.8 % — SIGNIFICANT CHANGE UP (ref 43–77)
NITRITE UR-MCNC: NEGATIVE — SIGNIFICANT CHANGE UP
NRBC # BLD: 0 /100 WBCS — SIGNIFICANT CHANGE UP (ref 0–0)
PH UR: 6.5 — SIGNIFICANT CHANGE UP (ref 5–8)
PLATELET # BLD AUTO: 240 K/UL — SIGNIFICANT CHANGE UP (ref 150–400)
POTASSIUM SERPL-MCNC: 4.4 MMOL/L — SIGNIFICANT CHANGE UP (ref 3.5–5.3)
POTASSIUM SERPL-SCNC: 4.4 MMOL/L — SIGNIFICANT CHANGE UP (ref 3.5–5.3)
PROT SERPL-MCNC: 6.9 G/DL — SIGNIFICANT CHANGE UP (ref 6–8.3)
PROT UR-MCNC: NEGATIVE MG/DL — SIGNIFICANT CHANGE UP
RBC # BLD: 4.27 M/UL — SIGNIFICANT CHANGE UP (ref 3.8–5.2)
RBC # FLD: 12.4 % — SIGNIFICANT CHANGE UP (ref 10.3–14.5)
RBC CASTS # UR COMP ASSIST: < 5 /HPF — SIGNIFICANT CHANGE UP
SODIUM SERPL-SCNC: 130 MMOL/L — LOW (ref 135–145)
SP GR SPEC: <=1.005 — SIGNIFICANT CHANGE UP (ref 1–1.03)
UROBILINOGEN FLD QL: 0.2 E.U./DL — SIGNIFICANT CHANGE UP
WBC # BLD: 5.82 K/UL — SIGNIFICANT CHANGE UP (ref 3.8–10.5)
WBC # FLD AUTO: 5.82 K/UL — SIGNIFICANT CHANGE UP (ref 3.8–10.5)
WBC UR QL: < 5 /HPF — SIGNIFICANT CHANGE UP

## 2022-08-18 PROCEDURE — 99213 OFFICE O/P EST LOW 20 MIN: CPT | Mod: GC

## 2022-08-18 PROCEDURE — G2066: CPT

## 2022-08-18 PROCEDURE — 93298 REM INTERROG DEV EVAL SCRMS: CPT

## 2022-08-18 RX ORDER — ATORVASTATIN CALCIUM 10 MG/1
10 TABLET, FILM COATED ORAL
Refills: 0 | Status: COMPLETED | COMMUNITY
Start: 2021-10-28 | End: 2022-08-18

## 2022-08-18 RX ORDER — ASPIRIN 81 MG/1
81 TABLET, CHEWABLE ORAL
Qty: 30 | Refills: 0 | Status: ACTIVE | COMMUNITY
Start: 2022-08-18

## 2022-08-18 RX ORDER — CLOPIDOGREL BISULFATE 75 MG/1
75 TABLET, FILM COATED ORAL
Qty: 30 | Refills: 2 | Status: DISCONTINUED | COMMUNITY
Start: 2022-04-08 | End: 2022-08-18

## 2022-08-18 RX ORDER — ATORVASTATIN CALCIUM 40 MG/1
40 TABLET, FILM COATED ORAL
Qty: 30 | Refills: 0 | Status: ACTIVE | COMMUNITY
Start: 2022-08-18

## 2022-08-18 NOTE — PLAN
[FreeTextEntry1] : Assessment: \par 86F with of HIV (VL<30 CD4 554 2/2022; switched from Juluca to Dovato in Oct 2021), hyperlipidemia, and anxiety disorder, who presents for HIV follow-up and routine labs. \par \par Plan: \par #HIV: \par VL<30 and CD4 554 2/2022 after being switched to Dovato in Oct 2021. Has tolerated medication well and denies any missed doses. Reports less anxiety on Dovato. \par - f/u CBC, CMP, CD4, and VL from today \par - continue with Dovato  mg PO daily \par - low risk for STIs, no repeat STI testing indicated today\par \par #Hyperlipidemia: \par - taking atorvastatin 40 mg PO daily, reports no side effects\par - ASCVD risk 12%\par - lipid panel from 10/2021 WNL, managed by primary care Dr. Larson\par \par #Anxiety: \par - taking citalopram 20 mg PO daily\par - states that anxiety is improved on Dovato compared to Juluca \par \par #History of TIA\par - hospitalized at [facility] for TIA in 3/2022\par - implantable loop recorder placed after TIA, per pt, cardiology has noticed 2 events on ILR since, both asymptomatic\par - no cause or residual effects noted on imaging/nbeuro workup\par - discharged on Plavix, completed course\par - discharged on ASA 81mg qd, takes ~3x/week as she sometimes forgets to take it\par \par #Hearing loss, progressive bilateral \par Pt describes as mild-moderate, interested in hearing aids\par - Audiology referral for hearing testing\par \par #Health care maintenance: \par - received COVID vaccine (Moderna x3) with Dr. Larson \par - HCM as per Dr. Larson (PCP) \par - DEXA scan 3/2022, bone density WNL\par - Per pt, pneumonia/shingles vaccines UTD with PCP\par \par

## 2022-08-18 NOTE — HISTORY OF PRESENT ILLNESS
[FreeTextEntry1] : Follow-up for HIV treatment, on Dovato [de-identified] : 86F with HIV (VL <30, CD4 542 Feb 2022), switched from Juluca to Dovato in 10/2021, with PMHx of anxiety disorder and HLD presents for routine follow-up and medication refills. Pt is feeling well and has no acute complaints today. She sees Dr. Larson for general primary care who manages chronic conditions. Since last visit, was hospitalized for TIA in 3/2022 and discharged on Plavix (completed) and ASA, which she continues to take per neurology. Completed DEXA scan which showed normal bone density. She follows with cardiology who noted her ICD showed 2 atypical episodes, pt was asymptomatic. Endorses progressive, bilateral hearing loss. Denies F/C, chest pain, dyspnea, N/V, diarrhea, leg swelling.

## 2022-08-18 NOTE — REVIEW OF SYSTEMS
[Unsteady Walking] : ataxia [Anxiety] : anxiety [Fever] : no fever [Chills] : no chills [Recent Change In Weight] : ~T no recent weight change [Chest Pain] : no chest pain [Lower Ext Edema] : no lower extremity edema [Shortness Of Breath] : no shortness of breath [Wheezing] : no wheezing [Cough] : no cough [Dyspnea on Exertion] : no dyspnea on exertion [Abdominal Pain] : no abdominal pain [Nausea] : no nausea [Constipation] : no constipation [Diarrhea] : diarrhea [Vomiting] : no vomiting [Dysuria] : no dysuria [Frequency] : no frequency [Skin Rash] : no skin rash [Headache] : no headache [Dizziness] : no dizziness [Depression] : no depression [de-identified] : subjective unsteadiness walking

## 2022-08-18 NOTE — PHYSICAL EXAM
[No Acute Distress] : no acute distress [Well-Appearing] : well-appearing [Normal Voice/Communication] : normal voice/communication [PERRL] : pupils equal round and reactive to light [EOMI] : extraocular movements intact [Normal Outer Ear/Nose] : the outer ears and nose were normal in appearance [Normal Oropharynx] : the oropharynx was normal [Normal Nasal Mucosa] : the nasal mucosa was normal [No Respiratory Distress] : no respiratory distress  [No Accessory Muscle Use] : no accessory muscle use [Clear to Auscultation] : lungs were clear to auscultation bilaterally [Normal Rate] : normal rate  [Regular Rhythm] : with a regular rhythm [Normal S1, S2] : normal S1 and S2 [No Rash] : no rash [No Skin Lesions] : no skin lesions [Normal] : no rash [Coordination Grossly Intact] : coordination grossly intact [No Focal Deficits] : no focal deficits [Normal Gait] : normal gait [Normal Affect] : the affect was normal [Alert and Oriented x3] : oriented to person, place, and time [Normal Mood] : the mood was normal [Normal Insight/Judgement] : insight and judgment were intact

## 2022-08-18 NOTE — END OF VISIT
[] : Resident [FreeTextEntry3] : I saw and evaluated the patient with resident. I reviewed the laboratory and radiologic data and reviewed the notes. \par I performed a medication reconciliation and  reviewed vaccination history and other health care maintenance and prevention topics in the EMR.\par I assessed patient's adherence to medications especially ARVs ( % 100 ) and inquired about AEs ( None).\par \par \par \par Labs today\par Referral for hearing test- will do in [facility] where she leaves on and off

## 2022-08-19 LAB
4/8 RATIO: 0.81 RATIO — LOW (ref 0.86–4.14)
ABS CD8: 626 /UL — SIGNIFICANT CHANGE UP (ref 90–775)
CD3 BLASTS SPEC-ACNC: 1138 /UL — SIGNIFICANT CHANGE UP (ref 396–2024)
CD3 BLASTS SPEC-ACNC: 79 % — SIGNIFICANT CHANGE UP (ref 58–84)
CD4 %: 35 % — SIGNIFICANT CHANGE UP (ref 30–56)
CD8 %: 43 % — SIGNIFICANT CHANGE UP (ref 11–43)
HIV-1 VIRAL LOAD RESULT: ABNORMAL
HIV1 RNA # SERPL NAA+PROBE: ABNORMAL COPIES/ML
HIV1 RNA SER-IMP: SIGNIFICANT CHANGE UP
HIV1 RNA SERPL NAA+PROBE-ACNC: ABNORMAL
HIV1 RNA SERPL NAA+PROBE-LOG#: ABNORMAL LG COP/ML
T-CELL CD4 SUBSET PNL BLD: 504 /UL — SIGNIFICANT CHANGE UP (ref 325–1251)

## 2022-08-22 DIAGNOSIS — Z86.73 PERSONAL HISTORY OF TRANSIENT ISCHEMIC ATTACK (TIA), AND CEREBRAL INFARCTION WITHOUT RESIDUAL DEFICITS: ICD-10-CM

## 2022-08-22 DIAGNOSIS — E78.5 HYPERLIPIDEMIA, UNSPECIFIED: ICD-10-CM

## 2022-08-22 DIAGNOSIS — B20 HUMAN IMMUNODEFICIENCY VIRUS [HIV] DISEASE: ICD-10-CM

## 2022-08-22 DIAGNOSIS — H91.90 UNSPECIFIED HEARING LOSS, UNSPECIFIED EAR: ICD-10-CM

## 2022-08-22 DIAGNOSIS — F41.9 ANXIETY DISORDER, UNSPECIFIED: ICD-10-CM

## 2022-09-14 ENCOUNTER — APPOINTMENT (OUTPATIENT)
Dept: OTOLARYNGOLOGY | Facility: CLINIC | Age: 87
End: 2022-09-14

## 2022-09-14 VITALS
BODY MASS INDEX: 21.16 KG/M2 | WEIGHT: 115 LBS | HEART RATE: 62 BPM | SYSTOLIC BLOOD PRESSURE: 150 MMHG | DIASTOLIC BLOOD PRESSURE: 90 MMHG | HEIGHT: 62 IN

## 2022-09-14 DIAGNOSIS — H61.23 IMPACTED CERUMEN, BILATERAL: ICD-10-CM

## 2022-09-14 DIAGNOSIS — H90.3 SENSORINEURAL HEARING LOSS, BILATERAL: ICD-10-CM

## 2022-09-14 PROCEDURE — 92567 TYMPANOMETRY: CPT

## 2022-09-14 PROCEDURE — 92557 COMPREHENSIVE HEARING TEST: CPT

## 2022-09-14 PROCEDURE — 99203 OFFICE O/P NEW LOW 30 MIN: CPT

## 2022-09-20 NOTE — HISTORY OF PRESENT ILLNESS
[de-identified] : 87 years old female patient with history of Persistent clogged ears for the past couple of months.  Patient is present today in the office with Bilateral cerumen impaction.  Change in hearing

## 2022-09-20 NOTE — PROCEDURE
[facility] [Cerumen Impaction] : Cerumen Impaction [] : Removal of Cerumen [FreeTextEntry5] : Roche suction #5, Ear curette, Ear alligator

## 2022-09-20 NOTE — CONSULT LETTER
[Dear  ___] : Dear  [unfilled], [Consult Letter:] : I had the pleasure of evaluating your patient, [unfilled]. [Please see my note below.] : Please see my note below. [Consult Closing:] : Thank you very much for allowing me to participate in the care of this patient.  If you have any questions, please do not hesitate to contact me. [Sincerely,] : Sincerely, [FreeTextEntry3] : Conrad Barrera MD, FACS\par Professor of Otolaryngology, [employer] [facility] [employer] at [facility]\par Director, [facility], Department of Otolaryngology, [facility]\par , Head & Neck Service Line, [facility]\par

## 2022-09-22 ENCOUNTER — NON-APPOINTMENT (OUTPATIENT)
Age: 87
End: 2022-09-22

## 2022-09-22 ENCOUNTER — APPOINTMENT (OUTPATIENT)
Dept: HEART AND VASCULAR | Facility: CLINIC | Age: 87
End: 2022-09-22

## 2022-09-22 PROCEDURE — 93298 REM INTERROG DEV EVAL SCRMS: CPT

## 2022-09-22 PROCEDURE — G2066: CPT

## 2022-10-27 ENCOUNTER — APPOINTMENT (OUTPATIENT)
Dept: HEART AND VASCULAR | Facility: CLINIC | Age: 87
End: 2022-10-27

## 2022-10-27 ENCOUNTER — NON-APPOINTMENT (OUTPATIENT)
Age: 87
End: 2022-10-27

## 2022-10-27 PROCEDURE — 93298 REM INTERROG DEV EVAL SCRMS: CPT

## 2022-10-27 PROCEDURE — G2066: CPT

## 2022-12-02 ENCOUNTER — APPOINTMENT (OUTPATIENT)
Dept: HEART AND VASCULAR | Facility: CLINIC | Age: 87
End: 2022-12-02

## 2022-12-02 ENCOUNTER — NON-APPOINTMENT (OUTPATIENT)
Age: 87
End: 2022-12-02

## 2022-12-02 PROCEDURE — G2066: CPT

## 2022-12-02 PROCEDURE — 93298 REM INTERROG DEV EVAL SCRMS: CPT

## 2023-01-07 ENCOUNTER — EMERGENCY (EMERGENCY)
Facility: HOSPITAL | Age: 88
LOS: 1 days | Discharge: HOME | End: 2023-01-07
Attending: EMERGENCY MEDICINE | Admitting: EMERGENCY MEDICINE
Payer: MEDICARE

## 2023-01-07 VITALS
TEMPERATURE: 98 F | HEART RATE: 88 BPM | SYSTOLIC BLOOD PRESSURE: 176 MMHG | DIASTOLIC BLOOD PRESSURE: 88 MMHG | HEIGHT: 62 IN | RESPIRATION RATE: 16 BRPM | OXYGEN SATURATION: 97 % | WEIGHT: 119.93 LBS

## 2023-01-07 PROCEDURE — 99285 EMERGENCY DEPT VISIT HI MDM: CPT

## 2023-01-07 PROCEDURE — 73130 X-RAY EXAM OF HAND: CPT | Mod: 26,LT

## 2023-01-07 PROCEDURE — 73090 X-RAY EXAM OF FOREARM: CPT | Mod: 26,LT

## 2023-01-07 RX ORDER — ALPRAZOLAM 0.25 MG
1 TABLET ORAL
Qty: 3 | Refills: 0
Start: 2023-01-07 | End: 2023-01-08

## 2023-01-07 RX ORDER — ALPRAZOLAM 0.25 MG
0.25 TABLET ORAL ONCE
Refills: 0 | Status: DISCONTINUED | OUTPATIENT
Start: 2023-01-07 | End: 2023-01-07

## 2023-01-07 RX ORDER — ACETAMINOPHEN 500 MG
650 TABLET ORAL ONCE
Refills: 0 | Status: COMPLETED | OUTPATIENT
Start: 2023-01-07 | End: 2023-01-07

## 2023-01-07 RX ADMIN — Medication 650 MILLIGRAM(S): at 22:07

## 2023-01-07 RX ADMIN — Medication 0.25 MILLIGRAM(S): at 22:07

## 2023-01-07 NOTE — ED PROVIDER NOTE - WR INTERPRETATION 2
MSK XR negative - No fracture, No dislocation, No foreign body, some DJD, significant dorsal swelling

## 2023-01-07 NOTE — ED PROVIDER NOTE - CARE PROVIDER_API CALL
Donovan Ayala)  [address] [place] [address]  Phone: [phone]  Fax: [phone]  Follow Up Time:

## 2023-01-07 NOTE — ED ADULT NURSE NOTE - CAS ELECT INFOMATION PROVIDED
Sling provided. Follow-up with Dr. Ayala. Return to the ED for new or worsening symptoms./DC instructions
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
## 2023-01-07 NOTE — ED PROVIDER NOTE - PATIENT PORTAL LINK FT
You can access the FollowMyHealth Patient Portal offered by [facility] by registering at the following website: [email or web address]. By joining [employer]’s FollowMyHealth portal, you will also be able to view your health information using other applications (apps) compatible with our system.

## 2023-01-07 NOTE — ED PROVIDER NOTE - NSFOLLOWUPINSTRUCTIONS_ED_ALL_ED_FT
please use the splint as tolerated, you may remove it as necessary for bathing.  Please use the sling.  Tylenol 650mg as meeded for pain.  At your request, Xanax has been given, please do not operate machinery or drive as this may be sedating.  Please see Dr Ayala the hand doctor at his office at [address] [place] [address] Floor [phone] on Monday, call the office Monday morning.  You may also see the hand doctor of your choosing in [place] this week - you may ask your primary care doctor for a referral.

## 2023-01-07 NOTE — ED ADULT NURSE NOTE - NS ED NURSE DC PT EDUCATION RESOURCES
Filemon Cruz refill request for:    One Touch Ultra 2 Kit w/Device  USE TO CHECK BLOOD SUGAR THREE TIME DAILY    Message from pharmacy: Patient lost device/kit. Please send new rx. Yes
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
## 2023-01-09 ENCOUNTER — APPOINTMENT (OUTPATIENT)
Dept: HEART AND VASCULAR | Facility: CLINIC | Age: 88
End: 2023-01-09
Payer: MEDICARE

## 2023-01-09 ENCOUNTER — NON-APPOINTMENT (OUTPATIENT)
Age: 88
End: 2023-01-09

## 2023-01-09 PROCEDURE — G2066: CPT

## 2023-01-09 PROCEDURE — 93298 REM INTERROG DEV EVAL SCRMS: CPT

## 2023-01-10 DIAGNOSIS — Z88.2 ALLERGY STATUS TO SULFONAMIDES: ICD-10-CM
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
## 2023-01-10 DIAGNOSIS — Z88.1 ALLERGY STATUS TO OTHER ANTIBIOTIC AGENTS STATUS: ICD-10-CM
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
## 2023-01-10 DIAGNOSIS — Z79.01 LONG TERM (CURRENT) USE OF ANTICOAGULANTS: ICD-10-CM

## 2023-01-10 DIAGNOSIS — Z88.0 ALLERGY STATUS TO PENICILLIN: ICD-10-CM

## 2023-01-10 DIAGNOSIS — Z79.82 LONG TERM (CURRENT) USE OF ASPIRIN: ICD-10-CM

## 2023-01-10 DIAGNOSIS — Z88.9 ALLERGY STATUS TO UNSPECIFIED DRUGS, MEDICAMENTS AND BIOLOGICAL SUBSTANCES: ICD-10-CM

## 2023-01-10 DIAGNOSIS — S60.222A CONTUSION OF LEFT HAND, INITIAL ENCOUNTER: ICD-10-CM

## 2023-01-10 DIAGNOSIS — Z86.73 PERSONAL HISTORY OF TRANSIENT ISCHEMIC ATTACK (TIA), AND CEREBRAL INFARCTION WITHOUT RESIDUAL DEFICITS: ICD-10-CM

## 2023-01-10 DIAGNOSIS — E78.5 HYPERLIPIDEMIA, UNSPECIFIED: ICD-10-CM

## 2023-01-10 DIAGNOSIS — M79.642 PAIN IN LEFT HAND: ICD-10-CM

## 2023-01-10 DIAGNOSIS — B20 HUMAN IMMUNODEFICIENCY VIRUS [HIV] DISEASE: ICD-10-CM

## 2023-01-11 ENCOUNTER — NON-APPOINTMENT (OUTPATIENT)
Age: 88
End: 2023-01-11

## 2023-01-11 RX ORDER — DOLUTEGRAVIR SODIUM AND LAMIVUDINE 50; 300 MG/1; MG/1
50-300 TABLET, FILM COATED ORAL
Qty: 30 | Refills: 11 | Status: ACTIVE | OUTPATIENT
Start: 2021-11-02

## 2023-01-12 ENCOUNTER — NON-APPOINTMENT (OUTPATIENT)
Age: 88
End: 2023-01-12

## 2023-01-31 NOTE — PATIENT PROFILE ADULT - HOW PATIENT ADDRESSED, PROFILE
----- Message from Rayna Baugh MD sent at 1/31/2023 11:57 AM CST -----  Urine neg will call w/ culture results but orourke snot look like UTI  
Appears that patient went to Urgent Care on 1/29 with UTI symptoms and was started on Bactrim DS bid x 5 days.  Patient confirmed that she is currently taking antibiotic course.  She will finish current antibiotic course and call the office if she is still experiencing symptoms.  Will then need to come in for [facility] UA and urine culture.  
Yumiko

## 2023-02-10 ENCOUNTER — APPOINTMENT (OUTPATIENT)
Dept: HEART AND VASCULAR | Facility: CLINIC | Age: 88
End: 2023-02-10
Payer: MEDICARE

## 2023-02-10 ENCOUNTER — NON-APPOINTMENT (OUTPATIENT)
Age: 88
End: 2023-02-10

## 2023-02-10 PROCEDURE — G2066: CPT

## 2023-02-10 PROCEDURE — 93298 REM INTERROG DEV EVAL SCRMS: CPT

## 2023-02-14 ENCOUNTER — APPOINTMENT (OUTPATIENT)
Dept: INFECTIOUS DISEASE | Facility: CLINIC | Age: 88
End: 2023-02-14
Payer: MEDICARE

## 2023-02-14 ENCOUNTER — OUTPATIENT (OUTPATIENT)
Dept: OUTPATIENT SERVICES | Facility: HOSPITAL | Age: 88
LOS: 1 days | End: 2023-02-14

## 2023-02-14 VITALS
DIASTOLIC BLOOD PRESSURE: 78 MMHG | HEART RATE: 74 BPM | OXYGEN SATURATION: 95 % | SYSTOLIC BLOOD PRESSURE: 134 MMHG | BODY MASS INDEX: 22.13 KG/M2 | WEIGHT: 121 LBS

## 2023-02-14 DIAGNOSIS — B20 HUMAN IMMUNODEFICIENCY VIRUS [HIV] DISEASE: ICD-10-CM

## 2023-02-14 PROCEDURE — 99213 OFFICE O/P EST LOW 20 MIN: CPT | Mod: GC

## 2023-02-14 RX ORDER — CLOPIDOGREL 75 MG/1
75 TABLET, FILM COATED ORAL DAILY
Refills: 0 | Status: ACTIVE | COMMUNITY
Start: 2023-02-14

## 2023-02-14 RX ORDER — ALPRAZOLAM 1 MG/1
1 TABLET ORAL DAILY
Refills: 0 | Status: ACTIVE | COMMUNITY
Start: 2023-02-14

## 2023-02-14 RX ORDER — CITALOPRAM HYDROBROMIDE 20 MG/1
20 TABLET, FILM COATED ORAL DAILY
Refills: 0 | Status: DISCONTINUED | COMMUNITY
Start: 2021-10-28 | End: 2023-02-14

## 2023-02-14 NOTE — PLAN
[FreeTextEntry1] : 86F with HIV (VL <30, CD4 504 Aug 2022), switched from Juluca to Dovato in 10/2021, anxiety disorder, HLD, HTN, prior TIA, frequent mechanical falls who presents for follow up\par \par #HIV\par Well controlled on Dovato\par - repeat labs today \par \par #HTN\par Multiple elevated BP readings here in our office, not currently on treatment\par - educated about getting BP cuff\par \par #HLD\par Lipitor 40\par \par #TIA\par Prior TIA with ILR\par - ON DAPT per PCP\par - cw statin\par - Instructed to follow up with EP about ILR recorder removal prior to leaving for [place] \par \par #STEPHANIE\par Patient now on prn alprazolam\par DCed Citalopram
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
## 2023-02-14 NOTE — END OF VISIT
[] : Resident [FreeTextEntry3] : I saw and evaluated the patient with resident. I reviewed the laboratory and radiologic data and reviewed the notes. \par I performed a medication reconciliation and  reviewed vaccination history and other health care maintenance and prevention topics in the EMR.\par I assessed patient's adherence to medications especially ARVs ( % 100 ) and inquired about AEs ( None).\par \par \par \par Moving to [place]\par Labs\par

## 2023-02-16 LAB
ALBUMIN SERPL ELPH-MCNC: 4.5 G/DL
ALP BLD-CCNC: 82 U/L
ALT SERPL-CCNC: 21 U/L
ANION GAP SERPL CALC-SCNC: 14 MMOL/L
AST SERPL-CCNC: 22 U/L
BASOPHILS # BLD AUTO: 0.04 K/UL
BASOPHILS NFR BLD AUTO: 0.6 %
BILIRUB SERPL-MCNC: 0.4 MG/DL
BUN SERPL-MCNC: 14 MG/DL
CALCIUM SERPL-MCNC: 9.9 MG/DL
CD3 CELLS # BLD: 1620 CELLS/UL
CD3 CELLS NFR BLD: 74 %
CD3+CD4+ CELLS # BLD: 722 CELLS/UL
CD3+CD4+ CELLS NFR BLD: 33 %
CD3+CD4+ CELLS/CD3+CD8+ CLL SPEC: 0.81 RATIO
CD3+CD8+ CELLS # SPEC: 886 CELLS/UL
CD3+CD8+ CELLS NFR BLD: 41 %
CHLORIDE SERPL-SCNC: 98 MMOL/L
CO2 SERPL-SCNC: 24 MMOL/L
CREAT SERPL-MCNC: 0.81 MG/DL
EGFR: 71 ML/MIN/1.73M2
EOSINOPHIL # BLD AUTO: 0.08 K/UL
EOSINOPHIL NFR BLD AUTO: 1.1 %
GLUCOSE SERPL-MCNC: 62 MG/DL
HCT VFR BLD CALC: 40.9 %
HGB BLD-MCNC: 13 G/DL
HIV1 RNA # SERPL NAA+PROBE: ABNORMAL
HIV1 RNA # SERPL NAA+PROBE: ABNORMAL COPIES/ML
IMM GRANULOCYTES NFR BLD AUTO: 0.1 %
LYMPHOCYTES # BLD AUTO: 2.1 K/UL
LYMPHOCYTES NFR BLD AUTO: 29.7 %
MAN DIFF?: NORMAL
MCHC RBC-ENTMCNC: 31.8 GM/DL
MCHC RBC-ENTMCNC: 33.3 PG
MCV RBC AUTO: 104.9 FL
MONOCYTES # BLD AUTO: 0.79 K/UL
MONOCYTES NFR BLD AUTO: 11.2 %
NEUTROPHILS # BLD AUTO: 4.06 K/UL
NEUTROPHILS NFR BLD AUTO: 57.3 %
PLATELET # BLD AUTO: 254 K/UL
POTASSIUM SERPL-SCNC: 5.1 MMOL/L
PROT SERPL-MCNC: 6.3 G/DL
RBC # BLD: 3.9 M/UL
RBC # FLD: 13 %
SODIUM SERPL-SCNC: 136 MMOL/L
VIRAL LOAD INTERP: NORMAL
VIRAL LOAD LOG: ABNORMAL LG COP/ML
WBC # FLD AUTO: 7.08 K/UL

## 2023-03-17 ENCOUNTER — NON-APPOINTMENT (OUTPATIENT)
Age: 88
End: 2023-03-17

## 2023-03-17 ENCOUNTER — APPOINTMENT (OUTPATIENT)
Dept: HEART AND VASCULAR | Facility: CLINIC | Age: 88
End: 2023-03-17
Payer: MEDICARE

## 2023-03-17 PROCEDURE — G2066: CPT

## 2023-03-17 PROCEDURE — 93298 REM INTERROG DEV EVAL SCRMS: CPT

## 2023-04-21 ENCOUNTER — NON-APPOINTMENT (OUTPATIENT)
Age: 88
End: 2023-04-21

## 2023-04-21 ENCOUNTER — APPOINTMENT (OUTPATIENT)
Dept: HEART AND VASCULAR | Facility: CLINIC | Age: 88
End: 2023-04-21
Payer: MEDICARE

## 2023-04-21 PROCEDURE — G2066: CPT

## 2023-04-21 PROCEDURE — 93298 REM INTERROG DEV EVAL SCRMS: CPT

## 2023-05-17 NOTE — DISCHARGE NOTE PROVIDER - YES NO FOR MLM POSITIVE OR NEGATIVE COVID RESULT
, Hydroxyzine Pregnancy And Lactation Text: This medication is not safe during pregnancy and should not be taken. It is also excreted in breast milk and breast feeding isn't recommended.

## 2023-05-26 ENCOUNTER — APPOINTMENT (OUTPATIENT)
Dept: HEART AND VASCULAR | Facility: CLINIC | Age: 88
End: 2023-05-26
Payer: MEDICARE

## 2023-05-26 ENCOUNTER — NON-APPOINTMENT (OUTPATIENT)
Age: 88
End: 2023-05-26

## 2023-05-26 PROCEDURE — G2066: CPT

## 2023-05-26 PROCEDURE — 93298 REM INTERROG DEV EVAL SCRMS: CPT

## 2023-06-29 ENCOUNTER — APPOINTMENT (OUTPATIENT)
Dept: HEART AND VASCULAR | Facility: CLINIC | Age: 88
End: 2023-06-29
Payer: MEDICARE

## 2023-06-29 ENCOUNTER — NON-APPOINTMENT (OUTPATIENT)
Age: 88
End: 2023-06-29

## 2023-06-29 PROCEDURE — G2066: CPT

## 2023-06-29 PROCEDURE — 93298 REM INTERROG DEV EVAL SCRMS: CPT

## 2023-08-03 ENCOUNTER — NON-APPOINTMENT (OUTPATIENT)
Age: 88
End: 2023-08-03

## 2023-08-03 ENCOUNTER — APPOINTMENT (OUTPATIENT)
Dept: HEART AND VASCULAR | Facility: CLINIC | Age: 88
End: 2023-08-03
Payer: MEDICARE

## 2023-08-03 PROCEDURE — 93298 REM INTERROG DEV EVAL SCRMS: CPT

## 2023-08-03 PROCEDURE — G2066: CPT

## 2023-09-05 ENCOUNTER — NON-APPOINTMENT (OUTPATIENT)
Age: 88
End: 2023-09-05

## 2023-09-05 ENCOUNTER — APPOINTMENT (OUTPATIENT)
Dept: HEART AND VASCULAR | Facility: CLINIC | Age: 88
End: 2023-09-05
Payer: MEDICARE

## 2023-09-06 PROCEDURE — 93298 REM INTERROG DEV EVAL SCRMS: CPT

## 2023-09-06 PROCEDURE — G2066: CPT

## 2023-10-10 ENCOUNTER — APPOINTMENT (OUTPATIENT)
Dept: HEART AND VASCULAR | Facility: CLINIC | Age: 88
End: 2023-10-10

## 2023-11-14 ENCOUNTER — APPOINTMENT (OUTPATIENT)
Dept: HEART AND VASCULAR | Facility: CLINIC | Age: 88
End: 2023-11-14

## 2023-12-19 ENCOUNTER — APPOINTMENT (OUTPATIENT)
Dept: HEART AND VASCULAR | Facility: CLINIC | Age: 88
End: 2023-12-19

## 2024-04-10 NOTE — ED PROVIDER NOTE - CLINICAL SUMMARY MEDICAL DECISION MAKING FREE TEXT BOX
Introduction Text (Please End With A Colon): The following procedure was deferred:  Pt was referred to Dr Patrick for evaluation for blepharoplasty
Size Of Lesion In Cm (Optional): 0
Detail Level: Detailed
Reason To Defer Override: Mohs scheduled on 4/18/24 with Dr. Dean
87F PMH HIV, HLD p/w speech difficulty. ~1.5hrs PTA pt was on phone, was attempting to give her address to someone and then states that the words wouldn't come out (unclear if unable to talk at all or just gibberish). Lasted a few min then resolved. Pt currently just feels frightened but otherwise completely asymptomatic. No hx of similar prior.   Vitals wnl, exam as above.  ddx: Possible TIA.  Code stroke activated.   Labs, CTs, EKG, reassess.   No indication for TPA at this time.

## 2024-04-29 NOTE — REASON FOR VISIT
[Initial Evaluation] : an initial evaluation for [FreeTextEntry2] : Persistent clogged ears for the past couple of months.  Patient states her level of severity is a level 8 out  of 10 and it occurs constant.  Patient states nothing helps to improve or worsens her Persistent clogged ears for the past couple of months. -FS AC HS  -c/w basal bolus  -endocrine consult

## 2024-05-16 NOTE — ED ADULT NURSE NOTE - NSIMPLEMENTINTERV_GEN_ALL_ED
"Epidural catheter Procedure Note    Pre-Procedure   Staff -        Anesthesiologist:  Amos Magallon MD       Resident/Fellow: Vernon Godfrey MD       Performed By: resident       Location: ICU       Procedure Start/Stop Times: 5/16/2024 9:59 AM and 5/16/2024 10:59 AM       Pre-Anesthestic Checklist: patient identified, IV checked, risks and benefits discussed, informed consent, monitors and equipment checked, pre-op evaluation, at physician/surgeon's request and post-op pain management  Timeout:       Correct Patient: Yes        Correct Procedure: Yes        Correct Site: Yes        Correct Position: Yes   Procedure Documentation  Procedure: epidural catheter       Patient Position: sitting       Patient Prep/Sterile Barriers: sterile gloves, mask, patient draped       Skin prep: Chloraprep       Local skin infiltrated with 3 mL of 1% lidocaine.        Insertion Site: T6-7. (midline approach).       Technique: LORT saline        SARAH at 4.5 cm.       Needle Type: [employer] needle       Needle Gauge: 17.        Needle Length (Inches): 3.5        Catheter: 19 G.          Catheter threaded easily.         4 cm epidural space.         Threaded 8.5 cm at skin.         # of attempts: 3 and  # of redirects:     Assessment/Narrative         Paresthesias: No.       Test dose of 3 mL lidocaine 1.5% w/ 1:200,000 epinephrine at 10:44 CDT.         Test dose negative, 3 minutes after injection, for signs of intravascular, subdural, or intrathecal injection.       Insertion/Infusion Method: LORT saline       Aspiration negative for Heme or CSF via Epidural Catheter.    Medication(s) Administered   Medication Administration Time: 5/16/2024 9:59 AM      FOR [facility] ([facility]) ONLY:   Pain Team Contact information: please page the Pain Team Via [employer]. Search \"Pain\". During daytime hours, please page the attending first. At night please page the resident first.      " Implemented All Fall Risk Interventions:  [place] to call system. Call bell, personal items and telephone within reach. Instruct patient to call for assistance. Room bathroom lighting operational. Non-slip footwear when patient is off stretcher. Physically safe environment: no spills, clutter or unnecessary equipment. Stretcher in lowest position, wheels locked, appropriate side rails in place. Provide visual cue, wrist band, yellow gown, etc. Monitor gait and stability. Monitor for mental status changes and reorient to person, place, and time. Review medications for side effects contributing to fall risk. Reinforce activity limits and safety measures with patient and family.

## 2024-09-10 NOTE — ED ADULT NURSE NOTE - CHIEF COMPLAINT QUOTE
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 There was no fracture and or dislocation. Riding a stationary bike over time would help with the pain as well and or some swimming.  pt c/o nausea, diarrhea, and lower abdominal pain since last night. pt states " My   one month ago of the same symptoms." +HIV.

## 2024-11-26 NOTE — END OF VISIT
This RN Breast Care Coordinator reviewed clarifying questions with patient. Advised the area will be imaged in Mammography after placement is complete. Reviewed approximate time anticipated for visit. Patient reports she is planning for children on school break.    [Time Spent: ___ minutes] : I have spent [unfilled] minutes of time on the encounter.